# Patient Record
Sex: FEMALE | Race: WHITE | NOT HISPANIC OR LATINO | Employment: UNEMPLOYED | URBAN - METROPOLITAN AREA
[De-identification: names, ages, dates, MRNs, and addresses within clinical notes are randomized per-mention and may not be internally consistent; named-entity substitution may affect disease eponyms.]

---

## 2017-02-07 ENCOUNTER — TELEPHONE (OUTPATIENT)
Dept: OBSTETRICS AND GYNECOLOGY | Facility: CLINIC | Age: 52
End: 2017-02-07

## 2017-02-07 ENCOUNTER — OFFICE VISIT (OUTPATIENT)
Dept: OBSTETRICS AND GYNECOLOGY | Facility: CLINIC | Age: 52
End: 2017-02-07
Payer: COMMERCIAL

## 2017-02-07 VITALS
WEIGHT: 112.31 LBS | HEIGHT: 63 IN | BODY MASS INDEX: 19.9 KG/M2 | SYSTOLIC BLOOD PRESSURE: 112 MMHG | DIASTOLIC BLOOD PRESSURE: 82 MMHG

## 2017-02-07 DIAGNOSIS — N95.2 ATROPHIC VAGINITIS: ICD-10-CM

## 2017-02-07 DIAGNOSIS — N76.0 VAGINITIS AND VULVOVAGINITIS: Primary | ICD-10-CM

## 2017-02-07 DIAGNOSIS — N95.1 VAGINAL DRYNESS, MENOPAUSAL: ICD-10-CM

## 2017-02-07 DIAGNOSIS — N89.8 VAGINA ITCHING: ICD-10-CM

## 2017-02-07 PROCEDURE — 87480 CANDIDA DNA DIR PROBE: CPT

## 2017-02-07 PROCEDURE — 99999 PR PBB SHADOW E&M-EST. PATIENT-LVL III: CPT | Mod: PBBFAC,,, | Performed by: NURSE PRACTITIONER

## 2017-02-07 PROCEDURE — 99214 OFFICE O/P EST MOD 30 MIN: CPT | Mod: S$GLB,,, | Performed by: NURSE PRACTITIONER

## 2017-02-07 RX ORDER — TAMOXIFEN CITRATE 20 MG/1
TABLET ORAL
COMMUNITY
Start: 2015-12-09 | End: 2017-11-15

## 2017-02-07 NOTE — MR AVS SNAPSHOT
"    [facility]  [address]  [place] [address]  Phone: [phone]  Fax: [phone]                  Gwendolyn Abraham   2017 9:40 AM   Office Visit    Description:  Female : 1965   Provider:  Jaycee Yi NP   Department:  [facility]           Reason for Visit     Vaginal Itching                To Do List           Future Appointments        Provider Department Dept Phone    3/21/2017 9:00 AM Enedina Short MD [facility] [phone]      Goals (5 Years of Data)     None      [facility]     [facility] Care [facility] -  Assistance  Registered nurses in the [facility] provide clinical advisement, health education, appointment booking, and other advisory services.  Call for this free service at [phone].             Medications           Message regarding Medications     Verify the changes and/or additions to your medication regime listed below are the same as discussed with your clinician today.  If any of these changes or additions are incorrect, please notify your healthcare provider.             Verify that the below list of medications is an accurate representation of the medications you are currently taking.  If none reported, the list may be blank. If incorrect, please contact your healthcare provider. Carry this list with you in case of emergency.           Current Medications     CITALOPRAM HYDROBROMIDE (CELEXA ORAL)     ESTRADIOL (VAGIFEM VAGL)     tamoxifen (NOLVADEX) 20 MG Tab            Clinical Reference Information           Your Vitals Were     BP Height Weight BMI       112/82 5' 3" (1.6 m) 51 kg (112 lb 5.2 oz) 19.9 kg/m2       Blood Pressure          Most Recent Value    BP  112/82      Allergies as of 2017     Codeine      Immunizations Administered on Date of Encounter - 2017     None      [facility] Sign-Up     Activating your [facility] account is as easy as 1-2-3!     1) Visit " [email or web address], select Sign Up Now, enter this activation code and your date of birth, then select Next.  [email or web address]  Expires: 3/24/2017  9:40 AM      2) Create a username and password to use when you visit [facility] in the future and select a security question in case you lose your password and select Next.    3) Enter your e-mail address and click Sign Up!    Additional Information  If you have questions, please e-mail [email or web address] or call [phone] to talk to our [facility] staff. Remember, [facility] is NOT to be used for urgent needs. For medical emergencies, dial 911.         Language Assistance Services     ATTENTION: Language assistance services are available, free of charge. Please call [phone].      ATENCIÓN: Si habla español, tiene a rubio disposición servicios gratuitos de asistencia lingüística. Llame al [phone].     CHÚ Ý: N?u b?n nói Ti?ng Vi?t, có các d?ch v? h? tr? ngôn ng? mi?n phí dành cho b?n. G?i s? [phone].         [facility] complies with applicable Federal civil rights laws and does not discriminate on the basis of race, color, national origin, age, disability, or sex.

## 2017-02-07 NOTE — PROGRESS NOTES
"Chief complaint: Vaginal Irritation    HPI: 51 y.o. pt with h/o breast CA/mastectomy/reconstruction taking tamoxifen. Vaginal Irritation/itching for the last several days. She reports that about 10 days ago she developed the symptoms. She then took a one day monistat 3 days later that had no symptom resolution. She then took a diflucan on Saturday and one yesterday with possible symptom improvement but not resolution. She does have known vaginal dryness/atrophy and also taking Vagifem and discussed with oncologist as well. She does generally feel dry, no use of moisturizers but does use lubrication with intercourse which can still be painful. She reports no changes in sexual partners, soaps, detergents, douching. No recent use of antibiotics. She denies pelvic pain, fever, chills, n/v/lesions or cuts.      ROS   Systemic: Not feeling tired (fatigue).  No fever chills   Gastrointestinal: No nausea, vomiting, no abdominal pain.  No diarrhea.  Genitourinary: No dysuria. No Pelvic Pain  Skin: No rash.  Visit Vitals    /82    Ht 5' 3" (1.6 m)    Wt 51 kg (112 lb 5.2 oz)    BMI 19.9 kg/m2       Physical Exam   Vital Signs:° Normal.  General Appearance:° Well developed. ° Well nourished.  Lymph Nodes: no Inguinal LAD  Urinary System:° Normal. Urethra: ° Meatus showed no abnormalities. ° No mass on the urethra.  Genitalia: External: ° Vulva was atrophied with labia minora obliterated ° Genitalia exhibited no lesion.                    Vagina: ° Mucosa was atrophied. ° A vaginal discharge was observed scant amt of thin white  Pelvic: Cervix: ° No cervical discharge. ° Showed no lesion. ° Not tender, no CMT         Uterus: ° Position was normal. ° Not tender.  Neurological:° No disorientation was observed.  Psychiatric:Affect: ° Normal.  Skin:° No skin lesions.   Perineum:° Normal. ° Did not have a mass.° No perineal lesions/rashes/erythema     Assessment/Plan:  1. Yeast infection vs atrophic vaginitis--affirm " ordered, possible residual vagifem, will call with results and changes in plan. Take diflucan as directed. Also discussed with pt that this may be both atrophic vaginitis and yeast both making a more severe itching/irritaion in the vagina. Discussed prevention measures including maintainece moisturizers and lubrication with intercourse. Will call with results and plan changes    RTC prn and as scheduled for annual exam.

## 2017-02-07 NOTE — TELEPHONE ENCOUNTER
Han pt, having vaginal burning, itching and swelling. Pt took diflacan and monistat but got not relief.

## 2017-02-08 LAB
CANDIDA RRNA VAG QL PROBE: NEGATIVE
G VAGINALIS RRNA GENITAL QL PROBE: NEGATIVE
T VAGINALIS RRNA GENITAL QL PROBE: NEGATIVE

## 2017-02-09 ENCOUNTER — TELEPHONE (OUTPATIENT)
Dept: OBSTETRICS AND GYNECOLOGY | Facility: CLINIC | Age: 52
End: 2017-02-09

## 2017-02-09 NOTE — TELEPHONE ENCOUNTER
Left message for pt to call back. Affirm (vaginal swab) was neg, wanted to see how pt is feeling and discusses results

## 2017-02-22 ENCOUNTER — TELEPHONE (OUTPATIENT)
Dept: OBSTETRICS AND GYNECOLOGY | Facility: CLINIC | Age: 52
End: 2017-02-22

## 2017-02-23 ENCOUNTER — PATIENT MESSAGE (OUTPATIENT)
Dept: OBSTETRICS AND GYNECOLOGY | Facility: CLINIC | Age: 52
End: 2017-02-23

## 2017-03-21 ENCOUNTER — OFFICE VISIT (OUTPATIENT)
Dept: OBSTETRICS AND GYNECOLOGY | Facility: CLINIC | Age: 52
End: 2017-03-21
Payer: COMMERCIAL

## 2017-03-21 VITALS
HEIGHT: 63 IN | DIASTOLIC BLOOD PRESSURE: 80 MMHG | SYSTOLIC BLOOD PRESSURE: 110 MMHG | WEIGHT: 113.31 LBS | BODY MASS INDEX: 20.08 KG/M2

## 2017-03-21 DIAGNOSIS — Z11.51 SCREENING FOR HUMAN PAPILLOMAVIRUS: ICD-10-CM

## 2017-03-21 DIAGNOSIS — Z01.419 WELL FEMALE EXAM WITH ROUTINE GYNECOLOGICAL EXAM: Primary | ICD-10-CM

## 2017-03-21 PROCEDURE — 88175 CYTOPATH C/V AUTO FLUID REDO: CPT

## 2017-03-21 PROCEDURE — 87624 HPV HI-RISK TYP POOLED RSLT: CPT

## 2017-03-21 PROCEDURE — 99999 PR PBB SHADOW E&M-EST. PATIENT-LVL III: CPT | Mod: PBBFAC,,, | Performed by: OBSTETRICS & GYNECOLOGY

## 2017-03-21 PROCEDURE — 99396 PREV VISIT EST AGE 40-64: CPT | Mod: S$GLB,,, | Performed by: OBSTETRICS & GYNECOLOGY

## 2017-03-21 RX ORDER — TAMOXIFEN CITRATE 10 MG/1
TABLET ORAL
COMMUNITY
Start: 2017-03-18 | End: 2017-03-21

## 2017-03-21 RX ORDER — ESTRADIOL 10 UG/1
INSERT VAGINAL
COMMUNITY
Start: 2017-02-18 | End: 2017-03-21

## 2017-03-21 RX ORDER — CITALOPRAM 40 MG/1
TABLET, FILM COATED ORAL
COMMUNITY
Start: 2017-03-18 | End: 2022-02-14 | Stop reason: SDUPTHER

## 2017-03-21 NOTE — MR AVS SNAPSHOT
"    [facility]  [address]  [place] [address]  Phone: [phone]  Fax: [phone]                  Gwendolyn Abraham   3/21/2017 9:00 AM   Office Visit    Description:  Female : 1965   Provider:  Enedina Short MD   Department:  [facility]           Diagnoses this Visit        Comments    Well female exam with routine gynecological exam    -  Primary     Screening for human papillomavirus                To Do List           Goals (5 Years of Data)     None      [facility]     [facility] Care [facility] -  Assistance  Registered nurses in the [facility] provide clinical advisement, health education, appointment booking, and other advisory services.  Call for this free service at [phone].             Medications           Message regarding Medications     Verify the changes and/or additions to your medication regime listed below are the same as discussed with your clinician today.  If any of these changes or additions are incorrect, please notify your healthcare provider.        STOP taking these medications     VAGIFEM 10 mcg Tab     ESTRADIOL (VAGIFEM VAGL)            Verify that the below list of medications is an accurate representation of the medications you are currently taking.  If none reported, the list may be blank. If incorrect, please contact your healthcare provider. Carry this list with you in case of emergency.           Current Medications     citalopram (CELEXA) 40 MG tablet     FEXOFENADINE/PSEUDOEPHEDRINE (ALLEGRA-D 24 HOUR ORAL)     tamoxifen (NOLVADEX) 20 MG Tab            Clinical Reference Information           Your Vitals Were     BP Height Weight BMI       110/80 5' 3" (1.6 m) 51.4 kg (113 lb 5.1 oz) 20.07 kg/m2       Blood Pressure          Most Recent Value    BP  110/80      Allergies as of 3/21/2017     Codeine      Immunizations Administered on Date of Encounter - 3/21/2017     None      Orders " Placed During Today's Visit      Normal Orders This Visit    HPV DNA probe, amplified     Liquid-based pap smear, screening       Language Assistance Services     ATTENTION: Language assistance services are available, free of charge. Please call [phone].      ATENCIÓN: Si habla justine, tiene a rubio disposición servicios gratuitos de asistencia lingüística. Llame al [phone].     CHÚ Ý: N?u b?n nói Ti?ng Vi?t, có các d?ch v? h? tr? ngôn ng? mi?n phí dành cho b?n. G?i s? [phone].         [facility] complies with applicable Federal civil rights laws and does not discriminate on the basis of race, color, national origin, age, disability, or sex.

## 2017-03-21 NOTE — PROGRESS NOTES
"CC: Well woman exam    Gwendolyn Abraham is a 52 y.o. female  presents for a well woman exam.  She is established.      HPI:  52 year old here for annual exam.  Patient s/p mastectomy with chemotherapy.  Currently on Tamoxifen.  No vaginal bleeding no breast complaints.  Having vaginal dryness and is planning to try the vaginal laser.  No hot flashes but having bone pains.      Past Medical History:   Diagnosis Date    Anemia     Asthma     Breast cancer     Breast neoplasm     right, Stage 2 s/p bilateral mastectomy, chemo with Adriamycin, Taxol, and Cytoxan, and XRT Qd x 6 weeks.  Followed by Dr. Hernandez    Depression     Menopausal vaginal dryness        Past Surgical History:   Procedure Laterality Date    BILATERAL SALPINGOOPHORECTOMY      BREAST RECONSTRUCTION      Breast reconstruction was performed (Stage I - ) (Stage 2 - , Staph infection)     SECTION  1991    HYSTERECTOMY, SUPRACERVICAL  2012    MASTECTOMY Bilateral        OB History    Para Term  AB SAB TAB Ectopic Multiple Living   2 2 2       2      # Outcome Date GA Lbr Devin/2nd Weight Sex Delivery Anes PTL Lv   2 Term     M CS-LTranv   Y   1 Term     F CS-LTranv   Y      Obstetric Comments   Menarche 11       Family History   Problem Relation Age of Onset    Hypertension Mother     Diabetes Father     Hypertension Father     Heart disease Father     Lung disease Father      MAC    Heart defect Father     Deep vein thrombosis Father     Coronary artery disease Father     Breast cancer Maternal Grandmother     Colon cancer Neg Hx     Ovarian cancer Neg Hx        Social History   Substance Use Topics    Smoking status: Never Smoker    Smokeless tobacco: None    Alcohol use Yes      Comment: ocassionally       /80  Ht 5' 3" (1.6 m)  Wt 51.4 kg (113 lb 5.1 oz)  BMI 20.07 kg/m2    ROS:  Review of Systems   Reason unable to perform ROS: no breast complaints. "   Constitutional: Negative for fatigue and unexpected weight change.   Respiratory: Negative for shortness of breath.    Cardiovascular: Negative for chest pain.   Gastrointestinal: Negative for abdominal pain, constipation, diarrhea and nausea.   Genitourinary: Positive for vaginal pain (with intercourse ). Negative for dyspareunia, dysuria, pelvic pain and vaginal bleeding.   Musculoskeletal: Negative for back pain.   Skin: Negative for rash.   Neurological: Negative for headaches.   Hematological: Negative for adenopathy.   Psychiatric/Behavioral: Negative for dysphoric mood. The patient is not nervous/anxious.        Physical Exam:  Physical Exam:   Constitutional: She is oriented to person, place, and time. She appears well-developed and well-nourished.      Neck: No thyromegaly present.    Cardiovascular: Normal rate.     Pulmonary/Chest: Effort normal and breath sounds normal. Right breast exhibits no mass, no nipple discharge, no skin change, no tenderness and no bleeding. Left breast exhibits no mass, no nipple discharge, no skin change, no tenderness and no bleeding.        Abdominal: Soft. Normal appearance and bowel sounds are normal. She exhibits no distension and no mass. There is no tenderness. There is no rebound and no guarding.     Genitourinary: Vagina normal. Pelvic exam was performed with patient supine. There is no rash, tenderness, lesion or injury on the right labia. There is no rash, tenderness, lesion or injury on the left labia. Uterus is absent. Cervix is normal. Right adnexum displays no mass, no tenderness and no fullness. Left adnexum displays no mass, no tenderness and no fullness. No erythema, tenderness, rectocele, cystocele or unspecified prolapse of vaginal walls in the vagina. No signs of injury around the vagina. No vaginal discharge found.           Musculoskeletal: Normal range of motion and moves all extremeties.      Lymphadenopathy:     She has no axillary adenopathy.         Right: No supraclavicular adenopathy present.        Left: No supraclavicular adenopathy present.    Neurological: She is alert and oriented to person, place, and time.    Skin: Skin is warm and dry.    Psychiatric: She has a normal mood and affect. Her behavior is normal. Judgment normal.       ASSESSMENT AND PLAN  Well female exam with routine gynecological exam  -     HPV DNA probe, amplified  -     Liquid-based pap smear, screening    Screening for human papillomavirus  -     HPV DNA probe, amplified      Discussed screening guidelines   Patient was counseled today on A.C.S. Pap guidelines and recommendations for yearly pelvic exams, mammograms and monthly self breast exams; to see her PCP for other health maintenance.     Return in about 1 year (around 3/21/2018).

## 2017-03-27 LAB
HPV16 DNA SPEC QL NAA+PROBE: NEGATIVE
HPV16+18+H RISK 12 DNA CVX-IMP: NEGATIVE
HPV18 DNA SPEC QL NAA+PROBE: NEGATIVE

## 2017-11-15 ENCOUNTER — OFFICE VISIT (OUTPATIENT)
Dept: OBSTETRICS AND GYNECOLOGY | Facility: CLINIC | Age: 52
End: 2017-11-15
Payer: COMMERCIAL

## 2017-11-15 VITALS
BODY MASS INDEX: 20.28 KG/M2 | HEIGHT: 63 IN | DIASTOLIC BLOOD PRESSURE: 68 MMHG | WEIGHT: 114.44 LBS | SYSTOLIC BLOOD PRESSURE: 108 MMHG

## 2017-11-15 DIAGNOSIS — N95.2 POSTMENOPAUSAL ATROPHIC VAGINITIS: Primary | ICD-10-CM

## 2017-11-15 DIAGNOSIS — N76.0 VAGINITIS AND VULVOVAGINITIS: ICD-10-CM

## 2017-11-15 PROCEDURE — 87480 CANDIDA DNA DIR PROBE: CPT

## 2017-11-15 PROCEDURE — 99999 PR PBB SHADOW E&M-EST. PATIENT-LVL III: CPT | Mod: PBBFAC,,, | Performed by: NURSE PRACTITIONER

## 2017-11-15 PROCEDURE — 99213 OFFICE O/P EST LOW 20 MIN: CPT | Mod: S$GLB,,, | Performed by: NURSE PRACTITIONER

## 2017-11-15 PROCEDURE — 87660 TRICHOMONAS VAGIN DIR PROBE: CPT

## 2017-11-15 RX ORDER — NYSTATIN AND TRIAMCINOLONE ACETONIDE 100000; 1 [USP'U]/G; MG/G
OINTMENT TOPICAL 3 TIMES DAILY
Qty: 15 G | Refills: 1 | Status: SHIPPED | OUTPATIENT
Start: 2017-11-15 | End: 2022-08-26

## 2017-11-15 RX ORDER — ESTRADIOL 10 UG/1
TABLET VAGINAL
COMMUNITY
Start: 2017-09-24 | End: 2022-08-26

## 2017-11-15 RX ORDER — FLUCONAZOLE 200 MG/1
TABLET ORAL
COMMUNITY
Start: 2017-11-09 | End: 2022-02-14

## 2017-11-15 RX ORDER — LETROZOLE 2.5 MG/1
TABLET, FILM COATED ORAL
COMMUNITY
Start: 2017-11-05 | End: 2022-05-13 | Stop reason: SDUPTHER

## 2017-11-16 ENCOUNTER — TELEPHONE (OUTPATIENT)
Dept: OBSTETRICS AND GYNECOLOGY | Facility: CLINIC | Age: 52
End: 2017-11-16

## 2017-11-16 NOTE — TELEPHONE ENCOUNTER
"----- Message from Sarah Guevara NP sent at 11/16/2017  8:20 AM CST -----  Call patient and tell her.....    "Your vaginal swab from the office came back negative.  This was a test for a yeast infection or a bacterial infection.  Your swab was normal.  Please call the office if you have any other questions.    (If needed for vaginal discharge and/or feminine odor, she may use OTC Rephresh gel.  It is a vaginal gel used to neutralize and maintain a healthy vaginal pH.  Patients who get Yeast or BV often use this to help reduce risk of vaginal issues.  It can be found at any drugstore on the feminine product aisle, and can be used as frequently as every 3 days.)  "

## 2017-11-16 NOTE — PROGRESS NOTES
Chief Complaint: Problem:     Chief Complaint   Patient presents with    Vulvar burning     since using KY on vacation x1wk ago        (Dr. Short patient)  Last Pap:  Supracervical HYST in       HPI:      Gwendolyn Abraham is a 52 y.o.  who presents today for complaints of vulvar burning, irritation, and redness after using KY jelly with her  while on vacation prior to intercourse.  She had a d=Diflucan at home and took it, but had no relief in symptoms.   Denies vaginal d/c.  States she has used KY many times in the past and they have not had a problem with it, nor has she had any adverse reaction to it before.   States she completed 3 sessions of vaginal rejuvenation with the Aileen Renetta laser device; last treatment done in 2017.  She feels like the day-to-day vaginal burning has improved significantly since the laser treatments, but intercourse remains painful.    LMP: No LMP recorded. Patient has had a hysterectomy.    Ms. Abraham is currently sexually active with a single male partner.   She declines STD screening today with her examination.      Past Medical History:   Diagnosis Date    Anemia     Asthma     Breast cancer     Breast neoplasm     right, Stage 2 s/p bilateral mastectomy, chemo with Adriamycin, Taxol, and Cytoxan, and XRT Qd x 6 weeks.  Followed by Dr. Hernandez    Depression     Menopausal vaginal dryness      Past Surgical History:   Procedure Laterality Date    BILATERAL SALPINGOOPHORECTOMY      BREAST RECONSTRUCTION      Breast reconstruction was performed (Stage I - 2012) (Stage 2 - 2013, Staph infection)     SECTION  1991    HYSTERECTOMY, SUPRACERVICAL  2012    MASTECTOMY Bilateral      Social History     Social History    Marital status:      Spouse name: N/A    Number of children: N/A    Years of education: N/A     Occupational History    Not on file.     Social History Main Topics    Smoking status: Never Smoker    Smokeless  "tobacco: Never Used    Alcohol use Yes      Comment: ocassionally    Drug use: No    Sexual activity: Yes     Partners: Male     Birth control/ protection: None      Comment:      Other Topics Concern    Not on file     Social History Narrative    No narrative on file     Family History   Problem Relation Age of Onset    Hypertension Mother     Diabetes Father     Hypertension Father     Heart disease Father     Lung disease Father      MAC    Heart defect Father     Deep vein thrombosis Father     Coronary artery disease Father     Breast cancer Maternal Grandmother     Colon cancer Neg Hx     Ovarian cancer Neg Hx      OB History      Para Term  AB Living    2 2 2     2    SAB TAB Ectopic Multiple Live Births            2        Obstetric Comments    Menarche 11          ROS:     GENERAL: Denies unintentional weight gain or weight loss. Feeling well overall.   SKIN: Denies rash or lesions.   HEENT: Denies headaches, or vision changes.   CARDIOVASCULAR: Denies palpitations or chest pain.   RESPIRATORY: Denies shortness of breath or dyspnea on exertion.  BREASTS: Denies pain, lumps, or nipple discharge.   ABDOMEN: Denies abdominal pain, constipation, diarrhea, nausea, vomiting, change in appetite.  URINARY: Denies frequency, dysuria, hematuria.  NEUROLOGIC: Denies syncope or weakness.   PSYCHIATRIC: Denies depression, anxiety or mood swings.  VULVAR: Reports pain, erythema, and irritation; no lesions and no itching.  VAGINAL: No relaxation, no itching, no abnormal bleeding and no lesions.    Physical Exam:      PHYSICAL EXAM:  /68   Ht 5' 3" (1.6 m)   Wt 51.9 kg (114 lb 6.7 oz)   BMI 20.27 kg/m²   Body mass index is 20.27 kg/m².     APPEARANCE: Well nourished, well developed, in no acute distress.  PSYCH: Appropriate mood and affect.  SKIN: No acne or hirsutism.  NECK: Neck symmetric without masses or thyromegaly  NODES: No inguinal, axillary, or supraclavicular lymph " node enlargement  CHEST: Normal respiratory effort.  ABDOMEN: Soft.  No tenderness or masses.    BREASTS: Symmetrical, no skin changes or visible lesions.  No palpable masses or nipple discharge bilaterally.  PELVIC: Normal external genitalia without lesions; moderate erythema and mild swelling to inner labia minora.  Normal hair distribution.  Adequate perineal body, normal urethral meatus.  Vagina moist and well rugated without lesions or discharge.  Cervix pink, without lesions, discharge or tenderness.  No significant cystocele or rectocele.  Bimanual exam shows uterus to be absent.  Adnexa without masses or tenderness.    EXTREMITIES: No edema.    Assessment/Plan:     Postmenopausal atrophic vaginitis    Vaginitis and vulvovaginitis  -     nystatin-triamcinolone (MYCOLOG) ointment; Apply topically 3 (three) times daily. X 7-10 days  Dispense: 15 g; Refill: 1  -     Vaginosis Screen by DNA Probe    *If symptoms persist/worsen after using Mycolog for 7-10 days, advised pt to RTC to see me or Dr. Short.      Counseling:     Patient was counseled today on current ASCCP pap guidelines, the recommendation for yearly pelvic exams, healthy diet and exercise routines, annual mammograms and breast self awareness. She is to see her PCP for other health maintenance.     Return if symptoms worsen or fail to improve.

## 2021-07-14 ENCOUNTER — TELEPHONE (OUTPATIENT)
Dept: SURGERY | Facility: CLINIC | Age: 56
End: 2021-07-14

## 2021-07-23 ENCOUNTER — TELEPHONE (OUTPATIENT)
Dept: HEMATOLOGY/ONCOLOGY | Facility: CLINIC | Age: 56
End: 2021-07-23

## 2021-07-26 ENCOUNTER — OFFICE VISIT (OUTPATIENT)
Dept: HEMATOLOGY/ONCOLOGY | Facility: CLINIC | Age: 56
End: 2021-07-26
Payer: COMMERCIAL

## 2021-07-26 VITALS
RESPIRATION RATE: 16 BRPM | HEIGHT: 63 IN | WEIGHT: 111.44 LBS | DIASTOLIC BLOOD PRESSURE: 61 MMHG | OXYGEN SATURATION: 100 % | HEART RATE: 78 BPM | SYSTOLIC BLOOD PRESSURE: 117 MMHG | BODY MASS INDEX: 19.75 KG/M2 | TEMPERATURE: 97 F

## 2021-07-26 DIAGNOSIS — Z17.0 MALIGNANT NEOPLASM OF UPPER-OUTER QUADRANT OF RIGHT BREAST IN FEMALE, ESTROGEN RECEPTOR POSITIVE: Primary | ICD-10-CM

## 2021-07-26 DIAGNOSIS — C50.411 MALIGNANT NEOPLASM OF UPPER-OUTER QUADRANT OF RIGHT BREAST IN FEMALE, ESTROGEN RECEPTOR POSITIVE: Primary | ICD-10-CM

## 2021-07-26 PROCEDURE — 99204 OFFICE O/P NEW MOD 45 MIN: CPT | Mod: S$GLB,,, | Performed by: INTERNAL MEDICINE

## 2021-07-26 PROCEDURE — 1159F PR MEDICATION LIST DOCUMENTED IN MEDICAL RECORD: ICD-10-PCS | Mod: CPTII,S$GLB,, | Performed by: INTERNAL MEDICINE

## 2021-07-26 PROCEDURE — 3008F PR BODY MASS INDEX (BMI) DOCUMENTED: ICD-10-PCS | Mod: CPTII,S$GLB,, | Performed by: INTERNAL MEDICINE

## 2021-07-26 PROCEDURE — 1126F PR PAIN SEVERITY QUANTIFIED, NO PAIN PRESENT: ICD-10-PCS | Mod: CPTII,S$GLB,, | Performed by: INTERNAL MEDICINE

## 2021-07-26 PROCEDURE — 99999 PR PBB SHADOW E&M-EST. PATIENT-LVL III: CPT | Mod: PBBFAC,,, | Performed by: INTERNAL MEDICINE

## 2021-07-26 PROCEDURE — 99999 PR PBB SHADOW E&M-EST. PATIENT-LVL III: ICD-10-PCS | Mod: PBBFAC,,, | Performed by: INTERNAL MEDICINE

## 2021-07-26 PROCEDURE — 1126F AMNT PAIN NOTED NONE PRSNT: CPT | Mod: CPTII,S$GLB,, | Performed by: INTERNAL MEDICINE

## 2021-07-26 PROCEDURE — 99204 PR OFFICE/OUTPT VISIT, NEW, LEVL IV, 45-59 MIN: ICD-10-PCS | Mod: S$GLB,,, | Performed by: INTERNAL MEDICINE

## 2021-07-26 PROCEDURE — 1159F MED LIST DOCD IN RCRD: CPT | Mod: CPTII,S$GLB,, | Performed by: INTERNAL MEDICINE

## 2021-07-26 PROCEDURE — 3008F BODY MASS INDEX DOCD: CPT | Mod: CPTII,S$GLB,, | Performed by: INTERNAL MEDICINE

## 2021-07-26 RX ORDER — FERROUS SULFATE, DRIED 160(50) MG
1 TABLET, EXTENDED RELEASE ORAL 2 TIMES DAILY WITH MEALS
COMMUNITY

## 2021-07-26 RX ORDER — CHOLECALCIFEROL (VITAMIN D3) 25 MCG
1000 TABLET ORAL DAILY
COMMUNITY

## 2022-01-06 ENCOUNTER — OFFICE VISIT (OUTPATIENT)
Dept: URGENT CARE | Facility: CLINIC | Age: 57
End: 2022-01-06
Payer: COMMERCIAL

## 2022-01-06 VITALS
HEIGHT: 63 IN | BODY MASS INDEX: 19.67 KG/M2 | SYSTOLIC BLOOD PRESSURE: 138 MMHG | TEMPERATURE: 99 F | OXYGEN SATURATION: 96 % | RESPIRATION RATE: 16 BRPM | WEIGHT: 111 LBS | DIASTOLIC BLOOD PRESSURE: 83 MMHG | HEART RATE: 108 BPM

## 2022-01-06 DIAGNOSIS — R05.9 COUGH: ICD-10-CM

## 2022-01-06 DIAGNOSIS — R09.89 RUNNY NOSE: ICD-10-CM

## 2022-01-06 DIAGNOSIS — U07.1 COVID-19: Primary | ICD-10-CM

## 2022-01-06 LAB
CTP QC/QA: YES
SARS-COV-2 RDRP RESP QL NAA+PROBE: POSITIVE

## 2022-01-06 PROCEDURE — 1159F MED LIST DOCD IN RCRD: CPT | Mod: CPTII,S$GLB,, | Performed by: PHYSICIAN ASSISTANT

## 2022-01-06 PROCEDURE — 1160F RVW MEDS BY RX/DR IN RCRD: CPT | Mod: CPTII,S$GLB,, | Performed by: PHYSICIAN ASSISTANT

## 2022-01-06 PROCEDURE — 3075F SYST BP GE 130 - 139MM HG: CPT | Mod: CPTII,S$GLB,, | Performed by: PHYSICIAN ASSISTANT

## 2022-01-06 PROCEDURE — 1159F PR MEDICATION LIST DOCUMENTED IN MEDICAL RECORD: ICD-10-PCS | Mod: CPTII,S$GLB,, | Performed by: PHYSICIAN ASSISTANT

## 2022-01-06 PROCEDURE — 3079F PR MOST RECENT DIASTOLIC BLOOD PRESSURE 80-89 MM HG: ICD-10-PCS | Mod: CPTII,S$GLB,, | Performed by: PHYSICIAN ASSISTANT

## 2022-01-06 PROCEDURE — 3008F BODY MASS INDEX DOCD: CPT | Mod: CPTII,S$GLB,, | Performed by: PHYSICIAN ASSISTANT

## 2022-01-06 PROCEDURE — U0002 COVID-19 LAB TEST NON-CDC: HCPCS | Mod: QW,S$GLB,, | Performed by: PHYSICIAN ASSISTANT

## 2022-01-06 PROCEDURE — 99203 OFFICE O/P NEW LOW 30 MIN: CPT | Mod: S$GLB,,, | Performed by: PHYSICIAN ASSISTANT

## 2022-01-06 PROCEDURE — 1160F PR REVIEW ALL MEDS BY PRESCRIBER/CLIN PHARMACIST DOCUMENTED: ICD-10-PCS | Mod: CPTII,S$GLB,, | Performed by: PHYSICIAN ASSISTANT

## 2022-01-06 PROCEDURE — 3075F PR MOST RECENT SYSTOLIC BLOOD PRESS GE 130-139MM HG: ICD-10-PCS | Mod: CPTII,S$GLB,, | Performed by: PHYSICIAN ASSISTANT

## 2022-01-06 PROCEDURE — 3008F PR BODY MASS INDEX (BMI) DOCUMENTED: ICD-10-PCS | Mod: CPTII,S$GLB,, | Performed by: PHYSICIAN ASSISTANT

## 2022-01-06 PROCEDURE — 3079F DIAST BP 80-89 MM HG: CPT | Mod: CPTII,S$GLB,, | Performed by: PHYSICIAN ASSISTANT

## 2022-01-06 PROCEDURE — 99203 PR OFFICE/OUTPT VISIT, NEW, LEVL III, 30-44 MIN: ICD-10-PCS | Mod: S$GLB,,, | Performed by: PHYSICIAN ASSISTANT

## 2022-01-06 PROCEDURE — U0002: ICD-10-PCS | Mod: QW,S$GLB,, | Performed by: PHYSICIAN ASSISTANT

## 2022-01-06 RX ORDER — BENZONATATE 100 MG/1
100 CAPSULE ORAL 3 TIMES DAILY PRN
Qty: 15 CAPSULE | Refills: 0 | Status: SHIPPED | OUTPATIENT
Start: 2022-01-06 | End: 2022-08-26

## 2022-01-06 RX ORDER — BENZONATATE 100 MG/1
100 CAPSULE ORAL 3 TIMES DAILY PRN
Qty: 15 CAPSULE | Refills: 0 | Status: SHIPPED | OUTPATIENT
Start: 2022-01-06 | End: 2022-01-06

## 2022-01-06 NOTE — PROGRESS NOTES
"Subjective:       Patient ID: Gwendolyn Abraham is a 56 y.o. female.    Vitals:  height is 5' 3" (1.6 m) and weight is 50.3 kg (111 lb). Her temperature is 98.5 °F (36.9 °C). Her blood pressure is 138/83 and her pulse is 108. Her respiration is 16 and oxygen saturation is 96%.     Chief Complaint: URI    Pt presents body aches, sore throat, fever, headache and cough since Tuesday    URI   This is a new problem. The current episode started in the past 7 days. The problem has been unchanged. There has been no fever. Associated symptoms include congestion, coughing and a sore throat. Treatments tried: Delsium. The treatment provided no relief.       HENT: Positive for congestion and sore throat.    Respiratory: Positive for cough.        Objective:      Physical Exam      Assessment:       1. COVID-19    2. Runny nose    3. Cough        Results for orders placed or performed in visit on 01/06/22   POCT COVID-19 Rapid Screening   Result Value Ref Range    POC Rapid COVID Positive (A) Negative     Acceptable Yes        Plan:         COVID-19    Runny nose  -     POCT COVID-19 Rapid Screening    Cough  -     Discontinue: benzonatate (TESSALON) 100 MG capsule; Take 1 capsule (100 mg total) by mouth 3 (three) times daily as needed for Cough.  Dispense: 15 capsule; Refill: 0  -     benzonatate (TESSALON) 100 MG capsule; Take 1 capsule (100 mg total) by mouth 3 (three) times daily as needed for Cough.  Dispense: 15 capsule; Refill: 0           Medical Decision Making:   Clinical Tests:   Lab Tests: Ordered and Reviewed       <> Summary of Lab: COVID+  Urgent Care Management:  Patient's COVID risk score: 1  Patient presents with complaints of URI like symptoms for the past 2 days.  On exam, she is afebrile and nontoxic appearing.  Lungs clear to auscultation bilaterally.  Vital signs reassuring.  She has tested positive for COVID-19.  Isolation requirements as well as ED precautions were discussed.  She was " advised symptomatic treatment as needed for symptoms.  She verbalized understanding and agreed with plan.       Patient Instructions   You have tested positive for COVID-19 today.  Per the CDC, you are now in a 5 day isolation.    This isolation starts from the day you first developed symptoms, not the day of your positive test. For example, if your symptoms began on a Monday, and you waited until Friday of the same week to get tested, and it was positive, your 5 day isolation begins from that Monday, not the Friday you tested positive.    However, if you are asymptomatic (a person who does not have any symptoms), and received a COVID-19 test that was positive, your 5 day isolation begins on the day you tested positive.  This is regardless if you were exposed to a known positive days earlier.  So for example, if you test positive as an asymptomatic on day 7 from exposure, you have now extended your 5 day quarantine.    In order to return to activities of daily living per the CDC guidelines, you can be around other after: 5 days since symptoms first appeared, 24 hours with no fever without the use of fever-reducing medications, and other symptoms (besides loss of taste or smell) must be improving.  Once you meet all these requirements you may return to your normal activities including social distancing, wearing masks, and frequent handwashing - YOU DO NOT NEED ANOTHER TEST, OR TO TEST NEGATIVE, IN ORDER TO END YOUR QUARANTINE!    Anyone who has been exposed to you since 2 days before your symptoms started should follow CDC guidelines for quarantine.  The CDC recommends quarantine if you have been in close contact (within 6 feet of someone for a cumulative total of 15 minutes or more over a 24-hour period) with someone who has COVID-19, unless you have been fully vaccinated. People who are fully vaccinated do NOT need to quarantine after contact with someone who had COVID-19 unless they have symptoms. However, fully  vaccinated people should get tested 3-5 days after their exposure, even they dont have symptoms and wear a mask indoors in public for 10 days following exposure or until their test result is negative.  Individuals are recommended to stay home for 5 days after your last contact with a person who has COVID-19.  Watch for symptoms of COVID-19.  If possible, stay away from people you live with, especially people who are at higher risk for getting very sick from COVID-19.  If you'd like to end your quarantine early, your options are as follows.  You may end your quarantine on day 7 IF you have a negative covid test at least 5 days from last known exposure with no covid symptoms by day 7.  Or you may end quarantine on day with no test and no covid symptoms.    Important home care recommendations include: monitor your symptoms, if you have trouble breathing please go to the ER. Stay in a separate room from other household members, if possible.  Use a separate bathroom, if possible.  Avoid contact with other members of the household and pets.  Don't share personal household items, like cups, towels, and utensils.  Wear a mask when around other people if able.  Please refer to CDC's websit for more information about what to do if you you're sick and how to notify your contacts.    Stay home except to get medical care. Most people with COVID-19 have mild illness and can recover at home without medical care. Do not leave your home, except to get medical care. Do not visit public areas. Get rest and stay hydrated. Call before you get medical care. Be sure to get care if you have trouble breathing, or have any other emergency warning signs, or if you think it is an emergency.  Avoid public transportation, ride-sharing, or taxis.  Separate yourself from other people.  As much as possible, stay in a specific room and away from other people and pets in your home. If possible, you should use a separate bathroom. If you need to be  around other people or animals in or outside of the home, wear a mask.    Tell your close contacts that they may have been exposed to COVID-19. An infected person can spread COVID-19 starting 48 hours (or 2 days) before the person has any symptoms or tests positive. By letting your close contacts know they may have been exposed to COVID-19, you are helping to protect everyone.    Additional guidance is available for those living in close quarters and shared housing on [facility]'s web site.  Please see COVID-19 and Animals if you have questions about pets.  If you are diagnosed with COVID-19, someone from the health department may call you. Answer the call to slow the spread.  Look for emergency warning signs* for COVID-19. If someone is showing any of these signs, seek emergency medical care immediately:  Trouble breathing  Persistent pain or pressure in the chest  New confusion  Inability to wake or stay awake  Pale, gray, or blue-colored skin, lips, or nail beds, depending on skin tone  *This list is not all possible symptoms. Please call your medical provider for any other symptoms that are severe or concerning to you.    Call ahead before visiting your doctor  Call ahead. Many medical visits for routine care are being postponed or done by phone or telemedicine.  If you have a medical appointment that cannot be postponed, call your doctors office, and tell them you have or may have COVID-19. This will help the office protect themselves and other patients.    You dont need to wear the mask if you are alone. If you cant put on a mask (because of trouble breathing, for example), cover your coughs and sneezes in some other way. Try to stay at least 6 feet away from other people. This will help protect the people around you.  Masks should not be placed on young children under age 2 years, anyone who has trouble breathing, or anyone who is not able to remove the mask without help.    Cover your coughs and sneezes  Cover  your mouth and nose with a tissue when you cough or sneeze.  Throw away used tissues in a lined trash can.  Immediately wash your hands with soap and water for at least 20 seconds. If soap and water are not available, clean your hands with an alcohol-based hand  that contains at least 60% alcohol.  hands wash light icon  Clean your hands often  Wash your hands often with soap and water for at least 20 seconds. This is especially important after blowing your nose, coughing, or sneezing; going to the bathroom; and before eating or preparing food.  Use hand  if soap and water are not available. Use an alcohol-based hand  with at least 60% alcohol, covering all surfaces of your hands and rubbing them together until they feel dry.  Soap and water are the best option, especially if hands are visibly dirty.  Avoid touching your eyes, nose, and mouth with unwashed hands.  Handwashing Tips  Avoid sharing personal household items  Do not share dishes, drinking glasses, cups, eating utensils, towels, or bedding with other people in your home.  Wash these items thoroughly after using them with soap and water or put in the .  spraybottle icon  Clean all high-touch surfaces everyday  Clean and disinfect high-touch surfaces in your sick room and bathroom; wear disposable gloves. Let someone else clean and disinfect surfaces in common areas, but you should clean your bedroom and bathroom, if possible.  If a caregiver or other person needs to clean and disinfect a sick persons bedroom or bathroom, they should do so on an as-needed basis. The caregiver/other person should wear a mask and disposable gloves prior to cleaning. They should wait as long as possible after the person who is sick has used the bathroom before coming in to clean and use the bathroom.  High-touch surfaces include phones, remote controls, counters, tabletops, doorknobs, bathroom fixtures, toilets, keyboards, tablets, and  bedside tables.    Clean and disinfect areas that may have blood, stool, or body fluids on them.  Use household  and disinfectants. Clean the area or item with soap and water or another detergent if it is dirty. Then, use a household disinfectant.  Be sure to follow the instructions on the label to ensure safe and effective use of the product. Many products recommend keeping the surface wet for several minutes to ensure germs are killed. Many also recommend precautions such as wearing gloves and making sure you have good ventilation during use of the product.  Use a product from EPAs List N: Disinfectants for Coronavirus (COVID-19)external icon.  Complete Disinfection Guidance      PLEASE READ YOUR DISCHARGE INSTRUCTIONS ENTIRELY AS IT CONTAINS IMPORTANT INFORMATION.    Please drink plenty of fluids.    Please get plenty of rest.    If not allergic, please take over the counter Tylenol (Acetaminophen) and/or Motrin (Ibuprofen) as directed for control of muscle aches, pain, and/or fever.    Please go to the Emergency Department for any shortness of breath or difficulty breathing.    For congestion, runny nose, or post nasal drip please take an over the counter antihistamine medication (Claritin/Zyrtec/Allegra) of your choice as directed or try an over the counter decongestant like Sudafed. You buy this behind the pharmacy counter.  You can also buy combination OTC antihistamine plus decongestant medications such at Zyrtec-D or Claritin-D.  Do not take decongestant if you suffer from high blood pressure.    For cough, you may be prescribed medication.  Take as prescribed.  If you were not prescribed any medication, you can use over the counter cough medications such as Robitussin.  If you have chest congestion you can consider using over the counter Mucinex but make sure you drink plenty of water to encourage mobilization of the secretions.    If you do have high blood pressure or palpitations, it is safe to  take Coricidin HBP for relief of sinus symptoms.    Sore throat recommendations: Warm fluids (tea with honey, soup, broth), warm salt water gargles, throat lozenges, rest, hydration.    If you  smoke, please stop smoking.    You must understand that you've received an Urgent Care treatment only and that you may be released before all of your medical problems are known or treated. You, the patient, will arrange for follow up as instructed. If your symptoms worsen or fail to improve you should go to the Emergency Room.      You have tested positive for COVID-19 today.      ISOLATION  If you tested positive and do not have symptoms, you must isolate for 5 days starting on the day of the positive test. I    If you tested positive and have symptoms, you must isolate for 5 days starting on the day of the first symptoms,  not the day of the positive test.     This is the most important part, both the CDC and the [facility] emphasize that you do not test out of isolation.     After 5 days, if your symptoms have improved and you have not had fever on day 5, you can return to the community on day 6- NO TESTING REQUIRED!      In fact, we do not retest if you were positive in the last 90 days.    After your 5 days of isolation are completed, the CDC recommends strict mask use for the first 5 days that you come out of isolation.  

## 2022-01-06 NOTE — PATIENT INSTRUCTIONS
You have tested positive for COVID-19 today.  Per the CDC, you are now in a 5 day isolation.    This isolation starts from the day you first developed symptoms, not the day of your positive test. For example, if your symptoms began on a Monday, and you waited until Friday of the same week to get tested, and it was positive, your 5 day isolation begins from that Monday, not the Friday you tested positive.    However, if you are asymptomatic (a person who does not have any symptoms), and received a COVID-19 test that was positive, your 5 day isolation begins on the day you tested positive.  This is regardless if you were exposed to a known positive days earlier.  So for example, if you test positive as an asymptomatic on day 7 from exposure, you have now extended your 5 day quarantine.    In order to return to activities of daily living per the CDC guidelines, you can be around other after: 5 days since symptoms first appeared, 24 hours with no fever without the use of fever-reducing medications, and other symptoms (besides loss of taste or smell) must be improving.  Once you meet all these requirements you may return to your normal activities including social distancing, wearing masks, and frequent handwashing - YOU DO NOT NEED ANOTHER TEST, OR TO TEST NEGATIVE, IN ORDER TO END YOUR QUARANTINE!    Anyone who has been exposed to you since 2 days before your symptoms started should follow CDC guidelines for quarantine.  The CDC recommends quarantine if you have been in close contact (within 6 feet of someone for a cumulative total of 15 minutes or more over a 24-hour period) with someone who has COVID-19, unless you have been fully vaccinated. People who are fully vaccinated do NOT need to quarantine after contact with someone who had COVID-19 unless they have symptoms. However, fully vaccinated people should get tested 3-5 days after their exposure, even they dont have symptoms and wear a mask indoors in public for  10 days following exposure or until their test result is negative.  Individuals are recommended to stay home for 5 days after your last contact with a person who has COVID-19.  Watch for symptoms of COVID-19.  If possible, stay away from people you live with, especially people who are at higher risk for getting very sick from COVID-19.  If you'd like to end your quarantine early, your options are as follows.  You may end your quarantine on day 7 IF you have a negative covid test at least 5 days from last known exposure with no covid symptoms by day 7.  Or you may end quarantine on day with no test and no covid symptoms.    Important home care recommendations include: monitor your symptoms, if you have trouble breathing please go to the ER. Stay in a separate room from other household members, if possible.  Use a separate bathroom, if possible.  Avoid contact with other members of the household and pets.  Don't share personal household items, like cups, towels, and utensils.  Wear a mask when around other people if able.  Please refer to CDC's websit for more information about what to do if you you're sick and how to notify your contacts.    Stay home except to get medical care. Most people with COVID-19 have mild illness and can recover at home without medical care. Do not leave your home, except to get medical care. Do not visit public areas. Get rest and stay hydrated. Call before you get medical care. Be sure to get care if you have trouble breathing, or have any other emergency warning signs, or if you think it is an emergency.  Avoid public transportation, ride-sharing, or taxis.  Separate yourself from other people.  As much as possible, stay in a specific room and away from other people and pets in your home. If possible, you should use a separate bathroom. If you need to be around other people or animals in or outside of the home, wear a mask.    Tell your close contacts that they may have been exposed to  COVID-19. An infected person can spread COVID-19 starting 48 hours (or 2 days) before the person has any symptoms or tests positive. By letting your close contacts know they may have been exposed to COVID-19, you are helping to protect everyone.    Additional guidance is available for those living in close quarters and shared housing on CDC's web site.  Please see COVID-19 and Animals if you have questions about pets.  If you are diagnosed with COVID-19, someone from the health department may call you. Answer the call to slow the spread.  Look for emergency warning signs* for COVID-19. If someone is showing any of these signs, seek emergency medical care immediately:  Trouble breathing  Persistent pain or pressure in the chest  New confusion  Inability to wake or stay awake  Pale, gray, or blue-colored skin, lips, or nail beds, depending on skin tone  *This list is not all possible symptoms. Please call your medical provider for any other symptoms that are severe or concerning to you.    Call ahead before visiting your doctor  Call ahead. Many medical visits for routine care are being postponed or done by phone or telemedicine.  If you have a medical appointment that cannot be postponed, call your doctors office, and tell them you have or may have COVID-19. This will help the office protect themselves and other patients.    You dont need to wear the mask if you are alone. If you cant put on a mask (because of trouble breathing, for example), cover your coughs and sneezes in some other way. Try to stay at least 6 feet away from other people. This will help protect the people around you.  Masks should not be placed on young children under age 2 years, anyone who has trouble breathing, or anyone who is not able to remove the mask without help.    Cover your coughs and sneezes  Cover your mouth and nose with a tissue when you cough or sneeze.  Throw away used tissues in a lined trash can.  Immediately wash your hands  with soap and water for at least 20 seconds. If soap and water are not available, clean your hands with an alcohol-based hand  that contains at least 60% alcohol.  hands wash light icon  Clean your hands often  Wash your hands often with soap and water for at least 20 seconds. This is especially important after blowing your nose, coughing, or sneezing; going to the bathroom; and before eating or preparing food.  Use hand  if soap and water are not available. Use an alcohol-based hand  with at least 60% alcohol, covering all surfaces of your hands and rubbing them together until they feel dry.  Soap and water are the best option, especially if hands are visibly dirty.  Avoid touching your eyes, nose, and mouth with unwashed hands.  Handwashing Tips  Avoid sharing personal household items  Do not share dishes, drinking glasses, cups, eating utensils, towels, or bedding with other people in your home.  Wash these items thoroughly after using them with soap and water or put in the .  spraybottle icon  Clean all high-touch surfaces everyday  Clean and disinfect high-touch surfaces in your sick room and bathroom; wear disposable gloves. Let someone else clean and disinfect surfaces in common areas, but you should clean your bedroom and bathroom, if possible.  If a caregiver or other person needs to clean and disinfect a sick persons bedroom or bathroom, they should do so on an as-needed basis. The caregiver/other person should wear a mask and disposable gloves prior to cleaning. They should wait as long as possible after the person who is sick has used the bathroom before coming in to clean and use the bathroom.  High-touch surfaces include phones, remote controls, counters, tabletops, doorknobs, bathroom fixtures, toilets, keyboards, tablets, and bedside tables.    Clean and disinfect areas that may have blood, stool, or body fluids on them.  Use household  and  disinfectants. Clean the area or item with soap and water or another detergent if it is dirty. Then, use a household disinfectant.  Be sure to follow the instructions on the label to ensure safe and effective use of the product. Many products recommend keeping the surface wet for several minutes to ensure germs are killed. Many also recommend precautions such as wearing gloves and making sure you have good ventilation during use of the product.  Use a product from EPAs List N: Disinfectants for Coronavirus (COVID-19)external icon.  Complete Disinfection Guidance      PLEASE READ YOUR DISCHARGE INSTRUCTIONS ENTIRELY AS IT CONTAINS IMPORTANT INFORMATION.    Please drink plenty of fluids.    Please get plenty of rest.    If not allergic, please take over the counter Tylenol (Acetaminophen) and/or Motrin (Ibuprofen) as directed for control of muscle aches, pain, and/or fever.    Please go to the Emergency Department for any shortness of breath or difficulty breathing.    For congestion, runny nose, or post nasal drip please take an over the counter antihistamine medication (Claritin/Zyrtec/Allegra) of your choice as directed or try an over the counter decongestant like Sudafed. You buy this behind the pharmacy counter.  You can also buy combination OTC antihistamine plus decongestant medications such at Zyrtec-D or Claritin-D.  Do not take decongestant if you suffer from high blood pressure.    For cough, you may be prescribed medication.  Take as prescribed.  If you were not prescribed any medication, you can use over the counter cough medications such as Robitussin.  If you have chest congestion you can consider using over the counter Mucinex but make sure you drink plenty of water to encourage mobilization of the secretions.    If you do have high blood pressure or palpitations, it is safe to take Coricidin HBP for relief of sinus symptoms.    Sore throat recommendations: Warm fluids (tea with honey, soup, broth),  warm salt water gargles, throat lozenges, rest, hydration.    If you  smoke, please stop smoking.    You must understand that you've received an Urgent Care treatment only and that you may be released before all of your medical problems are known or treated. You, the patient, will arrange for follow up as instructed. If your symptoms worsen or fail to improve you should go to the Emergency Room.      You have tested positive for COVID-19 today.      ISOLATION  If you tested positive and do not have symptoms, you must isolate for 5 days starting on the day of the positive test. I    If you tested positive and have symptoms, you must isolate for 5 days starting on the day of the first symptoms,  not the day of the positive test.     This is the most important part, both the CDC and the [facility] emphasize that you do not test out of isolation.     After 5 days, if your symptoms have improved and you have not had fever on day 5, you can return to the community on day 6- NO TESTING REQUIRED!      In fact, we do not retest if you were positive in the last 90 days.    After your 5 days of isolation are completed, the CDC recommends strict mask use for the first 5 days that you come out of isolation.  

## 2022-02-14 ENCOUNTER — OFFICE VISIT (OUTPATIENT)
Dept: HEMATOLOGY/ONCOLOGY | Facility: CLINIC | Age: 57
End: 2022-02-14
Payer: COMMERCIAL

## 2022-02-14 VITALS
DIASTOLIC BLOOD PRESSURE: 50 MMHG | BODY MASS INDEX: 19.43 KG/M2 | RESPIRATION RATE: 16 BRPM | WEIGHT: 109.69 LBS | HEIGHT: 63 IN | HEART RATE: 97 BPM | TEMPERATURE: 98 F | OXYGEN SATURATION: 98 % | SYSTOLIC BLOOD PRESSURE: 105 MMHG

## 2022-02-14 DIAGNOSIS — Z17.0 MALIGNANT NEOPLASM OF UPPER-OUTER QUADRANT OF RIGHT BREAST IN FEMALE, ESTROGEN RECEPTOR POSITIVE: Primary | ICD-10-CM

## 2022-02-14 DIAGNOSIS — C50.411 MALIGNANT NEOPLASM OF UPPER-OUTER QUADRANT OF RIGHT BREAST IN FEMALE, ESTROGEN RECEPTOR POSITIVE: Primary | ICD-10-CM

## 2022-02-14 PROCEDURE — 3074F PR MOST RECENT SYSTOLIC BLOOD PRESSURE < 130 MM HG: ICD-10-PCS | Mod: CPTII,S$GLB,, | Performed by: INTERNAL MEDICINE

## 2022-02-14 PROCEDURE — 3074F SYST BP LT 130 MM HG: CPT | Mod: CPTII,S$GLB,, | Performed by: INTERNAL MEDICINE

## 2022-02-14 PROCEDURE — 3078F DIAST BP <80 MM HG: CPT | Mod: CPTII,S$GLB,, | Performed by: INTERNAL MEDICINE

## 2022-02-14 PROCEDURE — 3078F PR MOST RECENT DIASTOLIC BLOOD PRESSURE < 80 MM HG: ICD-10-PCS | Mod: CPTII,S$GLB,, | Performed by: INTERNAL MEDICINE

## 2022-02-14 PROCEDURE — 3008F PR BODY MASS INDEX (BMI) DOCUMENTED: ICD-10-PCS | Mod: CPTII,S$GLB,, | Performed by: INTERNAL MEDICINE

## 2022-02-14 PROCEDURE — 99999 PR PBB SHADOW E&M-EST. PATIENT-LVL IV: ICD-10-PCS | Mod: PBBFAC,,, | Performed by: INTERNAL MEDICINE

## 2022-02-14 PROCEDURE — 99214 PR OFFICE/OUTPT VISIT, EST, LEVL IV, 30-39 MIN: ICD-10-PCS | Mod: S$GLB,,, | Performed by: INTERNAL MEDICINE

## 2022-02-14 PROCEDURE — 1159F MED LIST DOCD IN RCRD: CPT | Mod: CPTII,S$GLB,, | Performed by: INTERNAL MEDICINE

## 2022-02-14 PROCEDURE — 1159F PR MEDICATION LIST DOCUMENTED IN MEDICAL RECORD: ICD-10-PCS | Mod: CPTII,S$GLB,, | Performed by: INTERNAL MEDICINE

## 2022-02-14 PROCEDURE — 99999 PR PBB SHADOW E&M-EST. PATIENT-LVL IV: CPT | Mod: PBBFAC,,, | Performed by: INTERNAL MEDICINE

## 2022-02-14 PROCEDURE — 99214 OFFICE O/P EST MOD 30 MIN: CPT | Mod: S$GLB,,, | Performed by: INTERNAL MEDICINE

## 2022-02-14 PROCEDURE — 3008F BODY MASS INDEX DOCD: CPT | Mod: CPTII,S$GLB,, | Performed by: INTERNAL MEDICINE

## 2022-02-14 RX ORDER — CITALOPRAM 40 MG/1
40 TABLET, FILM COATED ORAL DAILY
Qty: 90 TABLET | Refills: 3 | Status: SHIPPED | OUTPATIENT
Start: 2022-02-14 | End: 2023-01-26 | Stop reason: SDUPTHER

## 2022-02-14 NOTE — PROGRESS NOTES
[facility]/ [facility]   at [facility] Note:      Chief Complaint:   Encounter Diagnosis   Name Primary?    Malignant neoplasm of upper-outer quadrant of right breast in female, estrogen receptor positive Yes       Cancer Staging  Malignant neoplasm of breast  Staging form: Breast, AJCC 8th Edition  - Clinical stage from 11/22/2011: Stage IIA (cT2, cN1mi, cM0, G2, ER+, NM+, HER2-) - Signed by Kateryna Mccray MD on 7/26/2021      HPI:  Gwendolyn Abraham is a 56 y.o. female who presents today for evaluation of breast cancer. She has been doing well and continues on Letrozole. She still has joint aches but they are well managed.   She has been diagnosed with vaginal lichen sclerosis and dysparuenia     Oncology History:  Oncologic history begins back in Nov 2011.   She found a lump in her right breast, mammogram confirmed the abnormality.   Seen by Dr Ma. Biopsy done 11/6/11: infiltrating ductal carcinoma.   11/22/11: bilateral mastectomies.  Right breast pathology: 4.5cm lobular carcinoma, foci of vascular invasion noted. The subepithelial nipple positive for tumor.   SLN+ individual and minute groups of metastatic tumor cells.  largest metastasis 0.3mm (just over 200 cells) ER >95%/NM 90%/Ki67=15%/ Her2-  Left breast: no malignancy  Pathologic staging was [id number]  Following recovery from surgery she had a left portacath placed and was started on chemotherapy.  She received 12 weekly cycles of Taxol followed by 4 cycles of AC.   Following chemotherapy she received radiation therapy to the chest wall, axilla and supraclavicular area. Radiation therapy was completed 7/30/12.  She has been on tamoxifen since completing XRT (started Tamoxifen 8/2012)  Patient was still actively menstruating at the time of her diagnosis     Patient had her second breast procedure Jan 24th-second part of the reconstruction.  Repeat mammogram and US - fat necrosis Kvng  "2015  Reconstruction done by Brian     4/14/15: CA 27-29 = 15  8/12/15: CA 27-29 = 15  8/5/16: CA 27-29 = 16  8/2017 switched from Tamoxifen to letrozole discussed 10 years of therapy for Stage II breast cancer    Now seeing Dr Willard Meyer for Prolia injection          Patient Active Problem List   Diagnosis    Asthma    Malignant neoplasm of breast    Atrophic vaginitis       Review of Systems:   Review of Systems   Constitutional: Negative.    HENT: Negative.    Respiratory: Negative.    Cardiovascular: Negative.    Gastrointestinal: Negative.    Musculoskeletal: Positive for joint pain. Negative for falls.   Neurological: Negative.    Endo/Heme/Allergies: Negative.    All other systems reviewed and are negative.      PHYSICAL EXAM:  BP (!) 105/50 (BP Location: Left arm, Patient Position: Sitting, BP Method: Large (Automatic))   Pulse 97   Temp 98.3 °F (36.8 °C) (Oral)   Resp 16   Ht 5' 3" (1.6 m)   Wt 49.7 kg (109 lb 10.9 oz)   SpO2 98%   BMI 19.43 kg/m²     General Appearance:    Alert, cooperative, no distress, appears stated age   Lungs:     Clear to auscultation bilaterally, respirations unlabored    Heart:    Regular rate and rhythm, S1 and S2 normal   Breast Exam:    Bilateral reconstruction, No tenderness, masses, or nipple abnormality   Abdomen:     Soft, non-tender, bowel sounds active, no masses, no organomegaly   Extremities:   Extremities normal, atraumatic, no cyanosis or edema   Pulses:   2+ and symmetric all extremities   Skin:   Skin color, texture, turgor normal, no rashes or lesions   Lymph nodes:   Cervical, supraclavicular, and axillary nodes normal   Neurologic:   CNII-XII intact, normal strength, sensation and reflexes     throughout           Pertinent Labs & Imaging:      No results found for this or any previous visit (from the past 24 hour(s)).    Assessment & Plan:    1. Malignant neoplasm of upper-outer quadrant of right breast in female, estrogen receptor positive  - " citalopram (CELEXA) 40 MG tablet; Take 1 tablet (40 mg total) by mouth once daily.  Dispense: 90 tablet; Refill: 3       Overall patient is doing well. No new issues on letrozole or Prolia.   Continue to follow with gynecology for dyspareunia and lichen sclerosis. Discussed intrarosa. Data is limited  Continue on Prolia q6mos with Dr Meyer  Continues to tolerate letrozole well, continue until 8/2022  RTC 6 mos as scheduled    Per NCCN Guidelines, breast cancer patients should be followed every 3-12 months for the first five years and then annually thereafter with annual mammography if mastectomy or breast reconstruction was not undertaken. At this time, there is no indication for routine laboratory or imaging in the surveillance for metastatic disease, unless clinical signs or symptoms arise.     Women on Aromatase inhibitors should have baseline bone density testing and at least biannually thereafter.     Healthy diet, low alcohol intake, and an active lifestyle, with a goal of an ideal BMI (20-25) is also encouraged to reduce the risk of breast cancer occurrences and recurrences, as well as improve side effects to anti-estrogen medications      Med Onc Chart Routing      Follow up with physician 6 months.   Follow up with [facility]    Labs    Imaging    Pharmacy appointment    Other referrals            Total time of this visit, including time spent face to face with patient and/or via video/audio, and also in preparing for today's visit for MDM and documentation. (Medical Decision Making, including consideration of possible diagnoses, management options, complex medical record review, review of diagnostic tests and information, consideration and discussion of significant complications based on comorbidities, and discussion with providers involved with the care of the patient) 45 minutes. Greater than 50% was spent face to face with the patient counseling and coordinating care.      Kateryna Mccray,  MD  02/14/2022

## 2022-05-13 DIAGNOSIS — Z17.0 MALIGNANT NEOPLASM OF UPPER-OUTER QUADRANT OF RIGHT BREAST IN FEMALE, ESTROGEN RECEPTOR POSITIVE: Primary | ICD-10-CM

## 2022-05-13 DIAGNOSIS — C50.411 MALIGNANT NEOPLASM OF UPPER-OUTER QUADRANT OF RIGHT BREAST IN FEMALE, ESTROGEN RECEPTOR POSITIVE: Primary | ICD-10-CM

## 2022-05-13 NOTE — TELEPHONE ENCOUNTER
"----- Message from Mirna Abdi sent at 5/13/2022  1:24 PM CDT -----  RX Name and Strength: letrozole (FEMARA) 2.5 mg Tab     How is the patient currently taking it?          Is this a 30 day or 90 day Rx? 90 day supply         Preferred Pharmacy with phone number:     [facility] [place] - [address]  [address]  [place] [address]  Phone: [phone] Fax: [phone]             Local or Mail Order: local         Ordering Provider: Dr Mccray         Contact Preference:                    Additional Information:  "Thank you for all that you do for our patients"     "

## 2022-05-16 RX ORDER — LETROZOLE 2.5 MG/1
2.5 TABLET, FILM COATED ORAL DAILY
Qty: 90 TABLET | Refills: 3 | Status: SHIPPED | OUTPATIENT
Start: 2022-05-16

## 2022-08-26 ENCOUNTER — OFFICE VISIT (OUTPATIENT)
Dept: HEMATOLOGY/ONCOLOGY | Facility: CLINIC | Age: 57
End: 2022-08-26
Payer: COMMERCIAL

## 2022-08-26 VITALS
WEIGHT: 110.81 LBS | HEART RATE: 75 BPM | BODY MASS INDEX: 19.63 KG/M2 | RESPIRATION RATE: 18 BRPM | DIASTOLIC BLOOD PRESSURE: 56 MMHG | OXYGEN SATURATION: 100 % | HEIGHT: 63 IN | TEMPERATURE: 99 F | SYSTOLIC BLOOD PRESSURE: 110 MMHG

## 2022-08-26 DIAGNOSIS — C50.411 MALIGNANT NEOPLASM OF UPPER-OUTER QUADRANT OF RIGHT BREAST IN FEMALE, ESTROGEN RECEPTOR POSITIVE: Primary | ICD-10-CM

## 2022-08-26 DIAGNOSIS — Z17.0 MALIGNANT NEOPLASM OF UPPER-OUTER QUADRANT OF RIGHT BREAST IN FEMALE, ESTROGEN RECEPTOR POSITIVE: Primary | ICD-10-CM

## 2022-08-26 PROCEDURE — 99999 PR PBB SHADOW E&M-EST. PATIENT-LVL III: ICD-10-PCS | Mod: PBBFAC,,, | Performed by: INTERNAL MEDICINE

## 2022-08-26 PROCEDURE — 3074F SYST BP LT 130 MM HG: CPT | Mod: CPTII,S$GLB,, | Performed by: INTERNAL MEDICINE

## 2022-08-26 PROCEDURE — 3008F BODY MASS INDEX DOCD: CPT | Mod: CPTII,S$GLB,, | Performed by: INTERNAL MEDICINE

## 2022-08-26 PROCEDURE — 1159F MED LIST DOCD IN RCRD: CPT | Mod: CPTII,S$GLB,, | Performed by: INTERNAL MEDICINE

## 2022-08-26 PROCEDURE — 99214 OFFICE O/P EST MOD 30 MIN: CPT | Mod: S$GLB,,, | Performed by: INTERNAL MEDICINE

## 2022-08-26 PROCEDURE — 3078F DIAST BP <80 MM HG: CPT | Mod: CPTII,S$GLB,, | Performed by: INTERNAL MEDICINE

## 2022-08-26 PROCEDURE — 3074F PR MOST RECENT SYSTOLIC BLOOD PRESSURE < 130 MM HG: ICD-10-PCS | Mod: CPTII,S$GLB,, | Performed by: INTERNAL MEDICINE

## 2022-08-26 PROCEDURE — 3078F PR MOST RECENT DIASTOLIC BLOOD PRESSURE < 80 MM HG: ICD-10-PCS | Mod: CPTII,S$GLB,, | Performed by: INTERNAL MEDICINE

## 2022-08-26 PROCEDURE — 99214 PR OFFICE/OUTPT VISIT, EST, LEVL IV, 30-39 MIN: ICD-10-PCS | Mod: S$GLB,,, | Performed by: INTERNAL MEDICINE

## 2022-08-26 PROCEDURE — 99999 PR PBB SHADOW E&M-EST. PATIENT-LVL III: CPT | Mod: PBBFAC,,, | Performed by: INTERNAL MEDICINE

## 2022-08-26 PROCEDURE — 3008F PR BODY MASS INDEX (BMI) DOCUMENTED: ICD-10-PCS | Mod: CPTII,S$GLB,, | Performed by: INTERNAL MEDICINE

## 2022-08-26 PROCEDURE — 1159F PR MEDICATION LIST DOCUMENTED IN MEDICAL RECORD: ICD-10-PCS | Mod: CPTII,S$GLB,, | Performed by: INTERNAL MEDICINE

## 2022-08-26 RX ORDER — DENOSUMAB 60 MG/ML
INJECTION SUBCUTANEOUS
COMMUNITY
Start: 2022-06-08

## 2022-08-26 RX ORDER — TACROLIMUS 1 MG/G
OINTMENT TOPICAL
COMMUNITY
Start: 2022-05-27

## 2022-08-26 RX ORDER — ALBUTEROL SULFATE 90 UG/1
AEROSOL, METERED RESPIRATORY (INHALATION)
COMMUNITY
Start: 2022-04-12

## 2022-08-26 RX ORDER — PRASTERONE 6.5 MG/1
INSERT VAGINAL
COMMUNITY
Start: 2022-03-01

## 2022-08-26 NOTE — PROGRESS NOTES
[facility]/ [facility]   at [facility] Note:      Chief Complaint:   Encounter Diagnosis   Name Primary?    Malignant neoplasm of upper-outer quadrant of right breast in female, estrogen receptor positive Yes       Cancer Staging  Malignant neoplasm of breast  Staging form: Breast, AJCC 8th Edition  - Clinical stage from 11/22/2011: Stage IIA (cT2, cN1mi, cM0, G2, ER+, [place]+, HER2-) - Signed by Kateryna Mccray MD on 7/26/2021      HPI:  Gwendolyn Abraham is a 57 y.o. female who presents today for evaluation of breast cancer. She has been doing well and continues on Letrozole. She still has joint aches but they are well managed.   Vaginal lichen sclerosis and dysparuenia are improved greatly with treatment with Dr Heide Cevallos    Oncology History:  Oncologic history begins back in Nov 2011.   She found a lump in her right breast, mammogram confirmed the abnormality.   Seen by Dr Ma. Biopsy done 11/6/11: infiltrating ductal carcinoma.   11/22/11: bilateral mastectomies.  Right breast pathology: 4.5cm lobular carcinoma, foci of vascular invasion noted. The subepithelial nipple positive for tumor.   SLN+ individual and minute groups of metastatic tumor cells.  largest metastasis 0.3mm (just over 200 cells) ER >95%/[place] 90%/Ki67=15%/ Her2-  Left breast: no malignancy  Pathologic staging was [id number]  Following recovery from surgery she had a left portacath placed and was started on chemotherapy.  She received 12 weekly cycles of Taxol followed by 4 cycles of AC.   Following chemotherapy she received radiation therapy to the chest wall, axilla and supraclavicular area. Radiation therapy was completed 7/30/12.  She has been on tamoxifen since completing XRT (started Tamoxifen 8/2012)  Patient was still actively menstruating at the time of her diagnosis     Patient had her second breast procedure Jan 24th-second part of the reconstruction.  Repeat mammogram  "and US - fat necrosis Jan 2015  Reconstruction done by Brian     4/14/15: CA 27-29 = 15  8/12/15: CA 27-29 = 15  8/5/16: CA 27-29 = 16  8/2017 switched from Tamoxifen to letrozole discussed 10 years of therapy for Stage II breast cancer    Now seeing Dr Willard Meyer for Prolia injection          Patient Active Problem List   Diagnosis    Asthma    Malignant neoplasm of breast    Atrophic vaginitis       Review of Systems:   Review of Systems   Constitutional: Negative.    HENT: Negative.    Respiratory: Negative.  Negative for cough and shortness of breath.    Cardiovascular: Negative.  Negative for chest pain.   Gastrointestinal: Negative.  Negative for abdominal pain and diarrhea.   Genitourinary: Negative for frequency.   Musculoskeletal: Positive for joint pain. Negative for back pain and falls.   Skin: Negative for rash.   Neurological: Negative.    Endo/Heme/Allergies: Negative.    Psychiatric/Behavioral: The patient is not nervous/anxious.    All other systems reviewed and are negative.      PHYSICAL EXAM:  BP (!) 110/56   Pulse 75   Temp 98.7 °F (37.1 °C) (Oral)   Resp 18   Ht 5' 3" (1.6 m)   Wt 50.3 kg (110 lb 12.5 oz)   SpO2 100%   BMI 19.62 kg/m²     General Appearance:    Alert, cooperative, no distress, appears stated age   Lungs:     Clear to auscultation bilaterally, respirations unlabored    Heart:    Regular rate and rhythm, S1 and S2 normal   Breast Exam:    Bilateral reconstruction, No tenderness, masses, or nipple abnormality   Abdomen:     Soft, non-tender, bowel sounds active, no masses, no organomegaly   Extremities:   Extremities normal, atraumatic, no cyanosis or edema   Pulses:   2+ and symmetric all extremities   Skin:   Skin color, texture, turgor normal, no rashes or lesions   Lymph nodes:   Cervical, supraclavicular, and axillary nodes normal   Neurologic:   CNII-XII intact, normal strength, sensation and reflexes     throughout           Pertinent Labs & Imaging:      No " results found for this or any previous visit (from the past 24 hour(s)).    Assessment & Plan:    1. Malignant neoplasm of upper-outer quadrant of right breast in female, estrogen receptor positive       Overall patient is doing well. No new issues on letrozole or Prolia.   Continue to follow with gynecology for dyspareunia and lichen sclerosis. Ok for testosterone cream  Continue on Prolia q6mos with Dr Meyer  Completes letrozole as she has been on it 10 year 8/2012 - 8/2022  RTC 6 mos as scheduled then will transition to annual visit     Per NCCN Guidelines, breast cancer patients should be followed every 3-12 months for the first five years and then annually thereafter with annual mammography if mastectomy or breast reconstruction was not undertaken. At this time, there is no indication for routine laboratory or imaging in the surveillance for metastatic disease, unless clinical signs or symptoms arise.     Women on Aromatase inhibitors should have baseline bone density testing and at least biannually thereafter.     Healthy diet, low alcohol intake, and an active lifestyle, with a goal of an ideal BMI (20-25) is also encouraged to reduce the risk of breast cancer occurrences and recurrences, as well as improve side effects to anti-estrogen medications      Med Onc Chart Routing      Follow up with physician 6 months.   Follow up with [facility]    Infusion scheduling note    Injection scheduling note    Labs    Imaging    Pharmacy appointment    Other referrals            Total time of this visit, including time spent face to face with patient and/or via video/audio, and also in preparing for today's visit for MDM and documentation. (Medical Decision Making, including consideration of possible diagnoses, management options, complex medical record review, review of diagnostic tests and information, consideration and discussion of significant complications based on comorbidities, and discussion with providers involved  with the care of the patient) 30 minutes. Greater than 50% was spent face to face with the patient counseling and coordinating care.      Kateryna Mccray MD  08/26/2022

## 2022-09-19 ENCOUNTER — OFFICE VISIT (OUTPATIENT)
Dept: URGENT CARE | Facility: CLINIC | Age: 57
End: 2022-09-19
Payer: COMMERCIAL

## 2022-09-19 VITALS
SYSTOLIC BLOOD PRESSURE: 104 MMHG | RESPIRATION RATE: 17 BRPM | TEMPERATURE: 98 F | HEART RATE: 122 BPM | WEIGHT: 110 LBS | OXYGEN SATURATION: 97 % | HEIGHT: 63 IN | DIASTOLIC BLOOD PRESSURE: 75 MMHG | BODY MASS INDEX: 19.49 KG/M2

## 2022-09-19 DIAGNOSIS — J06.9 BACTERIAL URI: Primary | ICD-10-CM

## 2022-09-19 DIAGNOSIS — B96.89 BACTERIAL URI: Primary | ICD-10-CM

## 2022-09-19 DIAGNOSIS — R50.9 FEVER, UNSPECIFIED FEVER CAUSE: ICD-10-CM

## 2022-09-19 LAB
CTP QC/QA: YES
CTP QC/QA: YES
POC MOLECULAR INFLUENZA A AGN: NEGATIVE
POC MOLECULAR INFLUENZA B AGN: NEGATIVE
SARS-COV-2 RDRP RESP QL NAA+PROBE: NEGATIVE

## 2022-09-19 PROCEDURE — 99213 PR OFFICE/OUTPT VISIT, EST, LEVL III, 20-29 MIN: ICD-10-PCS | Mod: S$GLB,,, | Performed by: PHYSICIAN ASSISTANT

## 2022-09-19 PROCEDURE — 3074F SYST BP LT 130 MM HG: CPT | Mod: CPTII,S$GLB,, | Performed by: PHYSICIAN ASSISTANT

## 2022-09-19 PROCEDURE — 1159F PR MEDICATION LIST DOCUMENTED IN MEDICAL RECORD: ICD-10-PCS | Mod: CPTII,S$GLB,, | Performed by: PHYSICIAN ASSISTANT

## 2022-09-19 PROCEDURE — 1160F RVW MEDS BY RX/DR IN RCRD: CPT | Mod: CPTII,S$GLB,, | Performed by: PHYSICIAN ASSISTANT

## 2022-09-19 PROCEDURE — 1160F PR REVIEW ALL MEDS BY PRESCRIBER/CLIN PHARMACIST DOCUMENTED: ICD-10-PCS | Mod: CPTII,S$GLB,, | Performed by: PHYSICIAN ASSISTANT

## 2022-09-19 PROCEDURE — U0002 COVID-19 LAB TEST NON-CDC: HCPCS | Mod: QW,S$GLB,, | Performed by: PHYSICIAN ASSISTANT

## 2022-09-19 PROCEDURE — 3074F PR MOST RECENT SYSTOLIC BLOOD PRESSURE < 130 MM HG: ICD-10-PCS | Mod: CPTII,S$GLB,, | Performed by: PHYSICIAN ASSISTANT

## 2022-09-19 PROCEDURE — 71046 X-RAY EXAM CHEST 2 VIEWS: CPT | Mod: FY,S$GLB,, | Performed by: RADIOLOGY

## 2022-09-19 PROCEDURE — U0002: ICD-10-PCS | Mod: QW,S$GLB,, | Performed by: PHYSICIAN ASSISTANT

## 2022-09-19 PROCEDURE — 71046 XR CHEST PA AND LATERAL: ICD-10-PCS | Mod: FY,S$GLB,, | Performed by: RADIOLOGY

## 2022-09-19 PROCEDURE — 87502 INFLUENZA DNA AMP PROBE: CPT | Mod: QW,S$GLB,, | Performed by: PHYSICIAN ASSISTANT

## 2022-09-19 PROCEDURE — 3008F PR BODY MASS INDEX (BMI) DOCUMENTED: ICD-10-PCS | Mod: CPTII,S$GLB,, | Performed by: PHYSICIAN ASSISTANT

## 2022-09-19 PROCEDURE — 1159F MED LIST DOCD IN RCRD: CPT | Mod: CPTII,S$GLB,, | Performed by: PHYSICIAN ASSISTANT

## 2022-09-19 PROCEDURE — 3008F BODY MASS INDEX DOCD: CPT | Mod: CPTII,S$GLB,, | Performed by: PHYSICIAN ASSISTANT

## 2022-09-19 PROCEDURE — 99213 OFFICE O/P EST LOW 20 MIN: CPT | Mod: S$GLB,,, | Performed by: PHYSICIAN ASSISTANT

## 2022-09-19 PROCEDURE — 3078F DIAST BP <80 MM HG: CPT | Mod: CPTII,S$GLB,, | Performed by: PHYSICIAN ASSISTANT

## 2022-09-19 PROCEDURE — 87502 POCT INFLUENZA A/B MOLECULAR: ICD-10-PCS | Mod: QW,S$GLB,, | Performed by: PHYSICIAN ASSISTANT

## 2022-09-19 PROCEDURE — 3078F PR MOST RECENT DIASTOLIC BLOOD PRESSURE < 80 MM HG: ICD-10-PCS | Mod: CPTII,S$GLB,, | Performed by: PHYSICIAN ASSISTANT

## 2022-09-19 RX ORDER — PROMETHAZINE HYDROCHLORIDE AND DEXTROMETHORPHAN HYDROBROMIDE 6.25; 15 MG/5ML; MG/5ML
5 SYRUP ORAL EVERY 4 HOURS PRN
Qty: 118 ML | Refills: 0 | Status: SHIPPED | OUTPATIENT
Start: 2022-09-19 | End: 2022-09-29

## 2022-09-19 RX ORDER — FLUCONAZOLE 150 MG/1
150 TABLET ORAL DAILY
Qty: 1 TABLET | Refills: 0 | Status: SHIPPED | OUTPATIENT
Start: 2022-09-19 | End: 2022-09-20

## 2022-09-19 RX ORDER — DOXYCYCLINE 100 MG/1
100 CAPSULE ORAL 2 TIMES DAILY
Qty: 14 CAPSULE | Refills: 0 | Status: SHIPPED | OUTPATIENT
Start: 2022-09-19

## 2022-09-19 NOTE — PATIENT INSTRUCTIONS
You must understand that you've received an Urgent Care treatment only and that you may be released before all your medical problems are known or treated. You, the patient, will arrange for follow up care as instructed.      Follow up with your PCP or specialty clinic as instructed in the next 2-3 days if not improved or as needed. You can call [phone] to schedule an appointment with appropriate provider.      If you condition worsens, we recommend that you receive another evaluation at the emergency room immediately or contact your primary medical clinic's after hours call service to discuss your concerns.      Please return here or go to the Emergency Department for any concerns or worsening condition.      If you were prescribed a narcotic or controlled substance, do not drive or operate heavy equipment or machinery while taking these medications.

## 2022-09-19 NOTE — PROGRESS NOTES
"Subjective:       Patient ID: Gwendolyn Abraham is a 57 y.o. female.    Vitals:  height is 5' 3" (1.6 m) and weight is 49.9 kg (110 lb). Her temperature is 98.2 °F (36.8 °C). Her blood pressure is 104/75 and her pulse is 122 (abnormal). Her respiration is 17 and oxygen saturation is 97%.     Chief Complaint: Sore Throat    Patient presents to the clinic with a sore throat, headache, fever, cough x a week     Patient provider note starts here:  Patient presents with a 7 day history of nasal congestion, sore throat, cough, generalized malaise and body aches. Reports fever 101.7 this morning and took Tylenol early this morning for it. Reports some chest soreness from coughing and some SOB with ambulation. Taking Delsym without significant relief. She is concerned for pneumonia since she has been having symptoms for the past week. Denies known ill contacts. Of note, she had a steroid injection last week for these symptoms which has not given her relief of the symptoms.     Sore Throat   This is a new problem. The current episode started in the past 7 days. The problem has been gradually worsening. Sore throat worse side: both. The pain is at a severity of 3/10. The pain is moderate. Associated symptoms include congestion, coughing, headaches, a hoarse voice, shortness of breath (with ambulation) and swollen glands. Pertinent negatives include no abdominal pain, diarrhea, ear discharge, ear pain, neck pain, trouble swallowing (Painful swallowing but tolerating secretions well) or vomiting. Treatments tried: delsym. The treatment provided mild relief.     Constitution: Negative for fever.   HENT:  Positive for congestion, postnasal drip and sore throat. Negative for ear pain, ear discharge and trouble swallowing (Painful swallowing but tolerating secretions well).    Neck: Negative for neck pain.   Cardiovascular:  Negative for chest pain, palpitations and sob on exertion.   Respiratory:  Positive for cough, sputum " production and shortness of breath (with ambulation). Negative for chest tightness and wheezing.    Gastrointestinal:  Negative for abdominal pain, vomiting and diarrhea.   Skin:  Negative for color change and wound.   Neurological:  Positive for headaches. Negative for numbness and tingling.     Objective:      Physical Exam   Constitutional: She is oriented to person, place, and time. She appears well-developed. She is cooperative.  Non-toxic appearance. She does not appear ill. No distress.   HENT:   Head: Normocephalic and atraumatic.   Ears:   Right Ear: Hearing, tympanic membrane, external ear and ear canal normal.   Left Ear: Hearing, tympanic membrane, external ear and ear canal normal.   Nose: Congestion present. No mucosal edema, rhinorrhea or nasal deformity. No epistaxis. Right sinus exhibits no maxillary sinus tenderness and no frontal sinus tenderness. Left sinus exhibits no maxillary sinus tenderness and no frontal sinus tenderness.   Mouth/Throat: Uvula is midline, oropharynx is clear and moist and mucous membranes are normal. No trismus in the jaw. Normal dentition. No uvula swelling. No oropharyngeal exudate, posterior oropharyngeal edema or posterior oropharyngeal erythema.   Eyes: Conjunctivae and lids are normal. No scleral icterus.   Neck: Trachea normal and phonation normal. Neck supple. No edema present. No erythema present. No neck rigidity present.   Cardiovascular: Normal rate, regular rhythm, normal heart sounds and normal pulses.   Pulmonary/Chest: Effort normal and breath sounds normal. No respiratory distress. She has no decreased breath sounds. She has no wheezes. She has no rhonchi.   Abdominal: Normal appearance.   Musculoskeletal: Normal range of motion.         General: No deformity. Normal range of motion.   Neurological: She is alert and oriented to person, place, and time. She exhibits normal muscle tone. Coordination normal.   Skin: Skin is warm, dry, intact, not diaphoretic  and not pale.   Psychiatric: Her speech is normal and behavior is normal. Judgment and thought content normal.   Nursing note and vitals reviewed.      Assessment:       1. Bacterial URI    2. Fever, unspecified fever cause          Results for orders placed or performed in visit on 09/19/22   POCT COVID-19 Rapid Screening   Result Value Ref Range    POC Rapid COVID Negative Negative     Acceptable Yes    POCT Influenza A/B MOLECULAR   Result Value Ref Range    POC Molecular Influenza A Ag Negative Negative, Not Reported    POC Molecular Influenza B Ag Negative Negative, Not Reported     Acceptable Yes      XR CHEST PA AND LATERAL  Result Date: 9/19/2022  EXAMINATION: XR CHEST PA AND LATERAL CLINICAL HISTORY: Fever, unspecified TECHNIQUE: PA and lateral views of the chest were performed. COMPARISON: None FINDINGS: Increased lucency is noted bilaterally compatible with COPD.  Cardiac silhouette is not enlarged.  There is bilateral hilar retraction and apical reticulonodular opacities.  Pleural plaques are also suspected bilaterally over the upper lungs.  Surgical clips overlie the vahid bilaterally.   No convincing acute abnormality. Bilateral pleural plaques and upper lung scarring. COPD. Electronically signed by: Vicky Olmos Date: 09/19/2022 Time: 12:21       Plan:         Bacterial URI  -     doxycycline (VIBRAMYCIN) 100 MG Cap; Take 1 capsule (100 mg total) by mouth 2 (two) times daily.  Dispense: 14 capsule; Refill: 0  -     promethazine-dextromethorphan (PROMETHAZINE-DM) 6.25-15 mg/5 mL Syrp; Take 5 mLs by mouth every 4 (four) hours as needed (Cough).  Dispense: 118 mL; Refill: 0  -     fluconazole (DIFLUCAN) 150 MG Tab; Take 1 tablet (150 mg total) by mouth once daily. for 1 day  Dispense: 1 tablet; Refill: 0    Fever, unspecified fever cause  -     POCT COVID-19 Rapid Screening  -     POCT Influenza A/B MOLECULAR  -     XR CHEST PA AND LATERAL; Future; Expected date:  09/19/2022         Medical Decision Making:   History:   Old Medical Records: I decided to obtain old medical records.  Differential Diagnosis:   Differential diagnosis includes but is not limited to: viral vs bacterial URI, pharyngitis, otitis, COVID 19, influenza, pneumonia.    Independently Interpreted Test(s):   I have ordered and independently interpreted X-rays - see summary below.       <> Summary of X-Ray Reading(s): Plain films reviewed and interpreted by me and negative for acute process    Clinical Tests:   Lab Tests: Ordered and Reviewed       <> Summary of Lab: COVID negative  Flu negative  Urgent Care Management:  Patient presents with complaints of cough in addition to other URI like symptoms which have been present for the past 7 days.  On exam, she is afebrile and nontoxic appearing.  Lungs are clear to auscultation bilaterally and vital signs are stable.  COVID and flu are both negative.  Chest x-ray shows no acute consolidation.  Covering with antibiotics due to the length since onset of symptoms in addition to promethazine to help with cough.  She was advised strict follow-up with primary care, ED precautions.  She verbalized understanding and agreed with plan.     Patient Instructions   You must understand that you've received an Urgent Care treatment only and that you may be released before all your medical problems are known or treated. You, the patient, will arrange for follow up care as instructed.      Follow up with your PCP or specialty clinic as instructed in the next 2-3 days if not improved or as needed. You can call [phone] to schedule an appointment with appropriate provider.      If you condition worsens, we recommend that you receive another evaluation at the emergency room immediately or contact your primary medical clinic's after hours call service to discuss your concerns.      Please return here or go to the Emergency Department for any concerns or worsening condition.       If you were prescribed a narcotic or controlled substance, do not drive or operate heavy equipment or machinery while taking these medications.

## 2023-02-10 ENCOUNTER — OFFICE VISIT (OUTPATIENT)
Dept: HEMATOLOGY/ONCOLOGY | Facility: CLINIC | Age: 58
End: 2023-02-10
Payer: COMMERCIAL

## 2023-02-10 VITALS
HEART RATE: 89 BPM | SYSTOLIC BLOOD PRESSURE: 118 MMHG | WEIGHT: 115.44 LBS | TEMPERATURE: 98 F | DIASTOLIC BLOOD PRESSURE: 58 MMHG | HEIGHT: 62 IN | BODY MASS INDEX: 21.24 KG/M2 | RESPIRATION RATE: 18 BRPM | OXYGEN SATURATION: 98 %

## 2023-02-10 DIAGNOSIS — Z17.0 MALIGNANT NEOPLASM OF UPPER-OUTER QUADRANT OF RIGHT BREAST IN FEMALE, ESTROGEN RECEPTOR POSITIVE: Primary | ICD-10-CM

## 2023-02-10 DIAGNOSIS — C50.411 MALIGNANT NEOPLASM OF UPPER-OUTER QUADRANT OF RIGHT BREAST IN FEMALE, ESTROGEN RECEPTOR POSITIVE: Primary | ICD-10-CM

## 2023-02-10 PROCEDURE — 3008F BODY MASS INDEX DOCD: CPT | Mod: CPTII,S$GLB,, | Performed by: INTERNAL MEDICINE

## 2023-02-10 PROCEDURE — 3008F PR BODY MASS INDEX (BMI) DOCUMENTED: ICD-10-PCS | Mod: CPTII,S$GLB,, | Performed by: INTERNAL MEDICINE

## 2023-02-10 PROCEDURE — 1159F MED LIST DOCD IN RCRD: CPT | Mod: CPTII,S$GLB,, | Performed by: INTERNAL MEDICINE

## 2023-02-10 PROCEDURE — 1159F PR MEDICATION LIST DOCUMENTED IN MEDICAL RECORD: ICD-10-PCS | Mod: CPTII,S$GLB,, | Performed by: INTERNAL MEDICINE

## 2023-02-10 PROCEDURE — 99999 PR PBB SHADOW E&M-EST. PATIENT-LVL IV: CPT | Mod: PBBFAC,,, | Performed by: INTERNAL MEDICINE

## 2023-02-10 PROCEDURE — 99214 OFFICE O/P EST MOD 30 MIN: CPT | Mod: S$GLB,,, | Performed by: INTERNAL MEDICINE

## 2023-02-10 PROCEDURE — 3074F PR MOST RECENT SYSTOLIC BLOOD PRESSURE < 130 MM HG: ICD-10-PCS | Mod: CPTII,S$GLB,, | Performed by: INTERNAL MEDICINE

## 2023-02-10 PROCEDURE — 99999 PR PBB SHADOW E&M-EST. PATIENT-LVL IV: ICD-10-PCS | Mod: PBBFAC,,, | Performed by: INTERNAL MEDICINE

## 2023-02-10 PROCEDURE — 99214 PR OFFICE/OUTPT VISIT, EST, LEVL IV, 30-39 MIN: ICD-10-PCS | Mod: S$GLB,,, | Performed by: INTERNAL MEDICINE

## 2023-02-10 PROCEDURE — 3078F DIAST BP <80 MM HG: CPT | Mod: CPTII,S$GLB,, | Performed by: INTERNAL MEDICINE

## 2023-02-10 PROCEDURE — 3074F SYST BP LT 130 MM HG: CPT | Mod: CPTII,S$GLB,, | Performed by: INTERNAL MEDICINE

## 2023-02-10 PROCEDURE — 3078F PR MOST RECENT DIASTOLIC BLOOD PRESSURE < 80 MM HG: ICD-10-PCS | Mod: CPTII,S$GLB,, | Performed by: INTERNAL MEDICINE

## 2023-02-10 RX ORDER — BACLOFEN 10 MG/1
10 TABLET ORAL
COMMUNITY
Start: 2023-01-12

## 2023-02-10 RX ORDER — METHYLPREDNISOLONE 4 MG/1
TABLET ORAL
COMMUNITY
Start: 2023-02-06

## 2023-02-10 NOTE — PROGRESS NOTES
[facility]/ [facility]   at [facility] Note:      Chief Complaint:   Encounter Diagnosis   Name Primary?    Malignant neoplasm of upper-outer quadrant of right breast in female, estrogen receptor positive Yes          Cancer Staging   Malignant neoplasm of breast  Staging form: Breast, AJCC 8th Edition  - Clinical stage from 11/22/2011: Stage IIA (cT2, cN1mi, cM0, G2, ER+, NM+, HER2-) - Signed by Kateryna Mccray MD on 7/26/2021      HPI:  Gwendolyn Abraham is a 57 y.o. female who presents today for evaluation of breast cancer. She has been doing well and is now off of Letrozole. She is doing really well.    Oncology History:  Oncologic history begins back in Nov 2011.   She found a lump in her right breast, mammogram confirmed the abnormality.   Seen by Dr Ma. Biopsy done 11/6/11: infiltrating ductal carcinoma.   11/22/11: bilateral mastectomies.  Right breast pathology: 4.5cm lobular carcinoma, foci of vascular invasion noted. The subepithelial nipple positive for tumor.   SLN+ individual and minute groups of metastatic tumor cells.  largest metastasis 0.3mm (just over 200 cells) ER >95%/NM 90%/Ki67=15%/ Her2-  Left breast: no malignancy  Pathologic staging was [id number]  Following recovery from surgery she had a left portacath placed and was started on chemotherapy.  She received 12 weekly cycles of Taxol followed by 4 cycles of AC.   Following chemotherapy she received radiation therapy to the chest wall, axilla and supraclavicular area. Radiation therapy was completed 7/30/12.  She has been on tamoxifen since completing XRT (started Tamoxifen 8/2012)  Patient was still actively menstruating at the time of her diagnosis     Patient had her second breast procedure Jan 24th-second part of the reconstruction.  Repeat mammogram and US - fat necrosis Jan 2015  Reconstruction done by Brian     4/14/15: CA 27-29 = 15  8/12/15: CA 27-29 = 15  8/5/16: CA  "27-29 = 16  8/2017 switched from Tamoxifen to letrozole discussed 10 years of therapy for Stage II breast cancer    Now seeing Dr Willard Meyer for Prolia injection          Patient Active Problem List   Diagnosis    Asthma    Malignant neoplasm of breast    Atrophic vaginitis       Review of Systems:   Review of Systems   Constitutional: Negative.    HENT: Negative.     Respiratory: Negative.  Negative for cough and shortness of breath.    Cardiovascular: Negative.  Negative for chest pain.   Gastrointestinal: Negative.  Negative for abdominal pain and diarrhea.   Genitourinary:  Negative for frequency.   Musculoskeletal:  Positive for joint pain. Negative for back pain and falls.   Skin:  Negative for rash.   Neurological: Negative.    Endo/Heme/Allergies: Negative.    Psychiatric/Behavioral:  The patient is not nervous/anxious.    All other systems reviewed and are negative.    PHYSICAL EXAM:  BP (!) 118/58   Pulse 89   Temp 98.3 °F (36.8 °C) (Oral)   Resp 18   Ht 5' 2" (1.575 m)   Wt 52.3 kg (115 lb 6.6 oz)   SpO2 98%   BMI 21.11 kg/m²     General Appearance:    Alert, cooperative, no distress, appears stated age   Lungs:     Clear to auscultation bilaterally, respirations unlabored    Heart:    Regular rate and rhythm, S1 and S2 normal   Breast Exam:    Bilateral reconstruction, No tenderness, masses, or nipple abnormality   Abdomen:     Soft, non-tender, bowel sounds active, no masses, no organomegaly   Extremities:   Extremities normal, atraumatic, no cyanosis or edema   Pulses:   2+ and symmetric all extremities   Skin:   Skin color, texture, turgor normal, no rashes or lesions   Lymph nodes:   Cervical, supraclavicular, and axillary nodes normal   Neurologic:   CNII-XII intact, normal strength, sensation and reflexes     throughout           Pertinent Labs & Imaging:      No results found for this or any previous visit (from the past 24 hour(s)).    Assessment & Plan:    1. Malignant neoplasm of " upper-outer quadrant of right breast in female, estrogen receptor positive      Overall patient is doing well. No new issues now that she has completed letrozole  Patient will continue to follow with gynecology for dyspareunia and lichen sclerosis. Ok for testosterone cream  Continue on Prolia q6mos with Dr Meyer  RTC 6 mos as scheduled    Per NCCN Guidelines, breast cancer patients should be followed every 3-12 months for the first five years and then annually thereafter with annual mammography if mastectomy or breast reconstruction was not undertaken. At this time, there is no indication for routine laboratory or imaging in the surveillance for metastatic disease, unless clinical signs or symptoms arise.     Women on Aromatase inhibitors should have baseline bone density testing and at least biannually thereafter.     Healthy diet, low alcohol intake, and an active lifestyle, with a goal of an ideal BMI (20-25) is also encouraged to reduce the risk of breast cancer occurrences and recurrences, as well as improve side effects to anti-estrogen medications      Med Onc Chart Routing      Follow up with physician 6 months.   Follow up with SAMANTA    Infusion scheduling note    Injection scheduling note    Labs    Imaging    Pharmacy appointment    Other referrals          Total time of this visit, including time spent face to face with patient and/or via video/audio, and also in preparing for today's visit for MDM and documentation. (Medical Decision Making, including consideration of possible diagnoses, management options, complex medical record review, review of diagnostic tests and information, consideration and discussion of significant complications based on comorbidities, and discussion with providers involved with the care of the patient) 30 minutes. Greater than 50% was spent face to face with the patient counseling and coordinating care.      Kateryna Mccray MD  02/10/2023

## 2023-06-30 ENCOUNTER — TELEPHONE (OUTPATIENT)
Dept: HEMATOLOGY/ONCOLOGY | Facility: CLINIC | Age: 58
End: 2023-06-30
Payer: COMMERCIAL

## 2023-06-30 NOTE — TELEPHONE ENCOUNTER
----- Message from Antonina De Santiago sent at 6/30/2023  9:00 AM CDT -----  Regarding: Reschedule Appt  Contact: Pt  Pt is calling regarding upcoming appt. Pt would like to reschedule from 8/18 to 8/25 if possible. Please adv pt       Confirmed contact below:   Contact Name:Gwendolyn Abraham  Phone Number: [phone]

## 2023-08-25 ENCOUNTER — OFFICE VISIT (OUTPATIENT)
Dept: HEMATOLOGY/ONCOLOGY | Facility: CLINIC | Age: 58
End: 2023-08-25
Payer: COMMERCIAL

## 2023-08-25 VITALS
HEART RATE: 97 BPM | SYSTOLIC BLOOD PRESSURE: 114 MMHG | HEIGHT: 62 IN | RESPIRATION RATE: 18 BRPM | BODY MASS INDEX: 20 KG/M2 | DIASTOLIC BLOOD PRESSURE: 56 MMHG | WEIGHT: 108.69 LBS | TEMPERATURE: 99 F | OXYGEN SATURATION: 98 %

## 2023-08-25 DIAGNOSIS — Z17.0 MALIGNANT NEOPLASM OF UPPER-OUTER QUADRANT OF RIGHT BREAST IN FEMALE, ESTROGEN RECEPTOR POSITIVE: Primary | ICD-10-CM

## 2023-08-25 DIAGNOSIS — C50.411 MALIGNANT NEOPLASM OF UPPER-OUTER QUADRANT OF RIGHT BREAST IN FEMALE, ESTROGEN RECEPTOR POSITIVE: Primary | ICD-10-CM

## 2023-08-25 PROCEDURE — 99999 PR PBB SHADOW E&M-EST. PATIENT-LVL IV: CPT | Mod: PBBFAC,,, | Performed by: INTERNAL MEDICINE

## 2023-08-25 PROCEDURE — 3078F PR MOST RECENT DIASTOLIC BLOOD PRESSURE < 80 MM HG: ICD-10-PCS | Mod: CPTII,S$GLB,, | Performed by: INTERNAL MEDICINE

## 2023-08-25 PROCEDURE — 3008F PR BODY MASS INDEX (BMI) DOCUMENTED: ICD-10-PCS | Mod: CPTII,S$GLB,, | Performed by: INTERNAL MEDICINE

## 2023-08-25 PROCEDURE — 3074F PR MOST RECENT SYSTOLIC BLOOD PRESSURE < 130 MM HG: ICD-10-PCS | Mod: CPTII,S$GLB,, | Performed by: INTERNAL MEDICINE

## 2023-08-25 PROCEDURE — 1159F MED LIST DOCD IN RCRD: CPT | Mod: CPTII,S$GLB,, | Performed by: INTERNAL MEDICINE

## 2023-08-25 PROCEDURE — 99999 PR PBB SHADOW E&M-EST. PATIENT-LVL IV: ICD-10-PCS | Mod: PBBFAC,,, | Performed by: INTERNAL MEDICINE

## 2023-08-25 PROCEDURE — 99214 OFFICE O/P EST MOD 30 MIN: CPT | Mod: S$GLB,,, | Performed by: INTERNAL MEDICINE

## 2023-08-25 PROCEDURE — 1159F PR MEDICATION LIST DOCUMENTED IN MEDICAL RECORD: ICD-10-PCS | Mod: CPTII,S$GLB,, | Performed by: INTERNAL MEDICINE

## 2023-08-25 PROCEDURE — 3078F DIAST BP <80 MM HG: CPT | Mod: CPTII,S$GLB,, | Performed by: INTERNAL MEDICINE

## 2023-08-25 PROCEDURE — 3008F BODY MASS INDEX DOCD: CPT | Mod: CPTII,S$GLB,, | Performed by: INTERNAL MEDICINE

## 2023-08-25 PROCEDURE — 3074F SYST BP LT 130 MM HG: CPT | Mod: CPTII,S$GLB,, | Performed by: INTERNAL MEDICINE

## 2023-08-25 PROCEDURE — 99214 PR OFFICE/OUTPT VISIT, EST, LEVL IV, 30-39 MIN: ICD-10-PCS | Mod: S$GLB,,, | Performed by: INTERNAL MEDICINE

## 2023-08-25 RX ORDER — VALACYCLOVIR HYDROCHLORIDE 500 MG/1
500 TABLET, FILM COATED ORAL
COMMUNITY
Start: 2023-08-05

## 2023-08-25 RX ORDER — NYSTATIN 500000 [USP'U]/1
1 TABLET, COATED ORAL 2 TIMES DAILY
COMMUNITY
Start: 2023-08-23

## 2023-08-25 NOTE — PROGRESS NOTES
[facility]/ [facility]   at [facility] Note:      Chief Complaint:   Encounter Diagnosis   Name Primary?    Malignant neoplasm of upper-outer quadrant of right breast in female, estrogen receptor positive Yes          Cancer Staging   Malignant neoplasm of breast  Staging form: Breast, AJCC 8th Edition  - Clinical stage from 11/22/2011: Stage IIA (cT2, cN1mi, cM0, G2, ER+, NE+, HER2-) - Signed by Kateryna Mccray MD on 7/26/2021      HPI:  Gwendolyn Abraham is a 58 y.o. female who presents today for follow up of breast cancer. She has been doing well and is now off of Letrozole x 1 year     Oncology History:  Oncologic history begins back in Nov 2011.   She found a lump in her right breast, mammogram confirmed the abnormality.   Seen by Dr Ma. Biopsy done 11/6/11: infiltrating ductal carcinoma.   11/22/11: bilateral mastectomies.  Right breast pathology: 4.5cm lobular carcinoma, foci of vascular invasion noted. The subepithelial nipple positive for tumor.   SLN+ individual and minute groups of metastatic tumor cells.  largest metastasis 0.3mm (just over 200 cells) ER >95%/NE 90%/Ki67=15%/ Her2-  Left breast: no malignancy  Pathologic staging was [id number]  Following recovery from surgery she had a left portacath placed and was started on chemotherapy.  She received 12 weekly cycles of Taxol followed by 4 cycles of AC.   Following chemotherapy she received radiation therapy to the chest wall, axilla and supraclavicular area. Radiation therapy was completed 7/30/12.  She has been on tamoxifen since completing XRT (started Tamoxifen 8/2012)  Patient was still actively menstruating at the time of her diagnosis     Patient had her second breast procedure Jan 24th-second part of the reconstruction.  Repeat mammogram and US - fat necrosis Jan 2015  Reconstruction done by Brian     4/14/15: CA 27-29 = 15  8/12/15: CA 27-29 = 15  8/5/16: CA 27-29 = 16  8/2017  "switched from Tamoxifen to letrozole discussed 10 years of therapy for Stage II breast cancer  Completed therapy Aug 2022    Now seeing Dr Willard Meyer for Prolia injection          Patient Active Problem List   Diagnosis    Asthma    Malignant neoplasm of breast    Atrophic vaginitis       Review of Systems:   Review of Systems   Constitutional: Negative.    HENT: Negative.     Respiratory: Negative.  Negative for cough and shortness of breath.    Cardiovascular: Negative.  Negative for chest pain.   Gastrointestinal: Negative.  Negative for abdominal pain and diarrhea.   Genitourinary:  Negative for frequency.   Musculoskeletal:  Positive for joint pain. Negative for back pain and falls.   Skin:  Negative for rash.   Neurological: Negative.    Endo/Heme/Allergies: Negative.    Psychiatric/Behavioral:  The patient is not nervous/anxious.    All other systems reviewed and are negative.      PHYSICAL EXAM:  BP (!) 114/56   Pulse 97   Temp 98.7 °F (37.1 °C) (Oral)   Resp 18   Ht 5' 2" (1.575 m)   Wt 49.3 kg (108 lb 11 oz)   SpO2 98%   BMI 19.88 kg/m²     General Appearance:    Alert, cooperative, no distress, appears stated age   Lungs:     Clear to auscultation bilaterally, respirations unlabored    Heart:    Regular rate and rhythm, S1 and S2 normal   Breast Exam:    Bilateral reconstruction, No tenderness, masses, or nipple abnormality   Abdomen:     Soft, non-tender, bowel sounds active, no masses, no organomegaly   Extremities:   Extremities normal, atraumatic, no cyanosis or edema   Pulses:   2+ and symmetric all extremities   Skin:   Skin color, texture, turgor normal, no rashes or lesions   Lymph nodes:   Cervical, supraclavicular, and axillary nodes normal   Neurologic:   CNII-XII intact, normal strength, sensation and reflexes     throughout           Pertinent Labs & Imaging:      No results found for this or any previous visit (from the past 24 hour(s)).    Assessment & Plan:    1. Malignant " neoplasm of upper-outer quadrant of right breast in female, estrogen receptor positive      Overall patient is doing well. Off of letrozole x 1 year   Patient will continue to follow with gynecology for dyspareunia and lichen sclerosis. Ok for testosterone cream  Continue on Prolia q6mos with Dr Meyer  RTC 6 mos as scheduled    Per NCCN Guidelines, breast cancer patients should be followed every 3-12 months for the first five years and then annually thereafter with annual mammography if mastectomy or breast reconstruction was not undertaken. At this time, there is no indication for routine laboratory or imaging in the surveillance for metastatic disease, unless clinical signs or symptoms arise.     Women on Aromatase inhibitors should have baseline bone density testing and at least biannually thereafter.     Healthy diet, low alcohol intake, and an active lifestyle, with a goal of an ideal BMI (20-25) is also encouraged to reduce the risk of breast cancer occurrences and recurrences, as well as improve side effects to anti-estrogen medications      Med Onc Chart Routing      Follow up with physician 1 year.   Follow up with SAMANTA    Infusion scheduling note    Injection scheduling note    Labs    Imaging    Pharmacy appointment    Other referrals                Total time of this visit, including time spent face to face with patient and/or via video/audio, and also in preparing for today's visit for MDM and documentation. (Medical Decision Making, including consideration of possible diagnoses, management options, complex medical record review, review of diagnostic tests and information, consideration and discussion of significant complications based on comorbidities, and discussion with providers involved with the care of the patient) 30 minutes. Greater than 50% was spent face to face with the patient counseling and coordinating care.      Kateryna Mccray MD  08/25/2023

## 2024-05-07 DIAGNOSIS — Z17.0 MALIGNANT NEOPLASM OF UPPER-OUTER QUADRANT OF RIGHT BREAST IN FEMALE, ESTROGEN RECEPTOR POSITIVE: ICD-10-CM

## 2024-05-07 DIAGNOSIS — C50.411 MALIGNANT NEOPLASM OF UPPER-OUTER QUADRANT OF RIGHT BREAST IN FEMALE, ESTROGEN RECEPTOR POSITIVE: ICD-10-CM

## 2024-05-07 RX ORDER — CITALOPRAM 40 MG/1
40 TABLET, FILM COATED ORAL DAILY
Qty: 90 TABLET | Refills: 3 | Status: SHIPPED | OUTPATIENT
Start: 2024-05-07

## 2024-05-07 NOTE — TELEPHONE ENCOUNTER
"----- Message from Sienna Reddy sent at 5/7/2024 12:05 PM CDT -----  Regarding: Refill  RX Name and Strength:  citalopram (CELEXA) 40 MG tablet        How is the patient currently taking it?    Take 1 tablet (40 mg total) by mouth once daily. - Oral      Is this a 30 day or 90 day Rx?   90 tablet      Preferred Pharmacy with phone number:    [facility]  [address] [facility] [address]  [address] [facility] [address]  [place] [address]  Phone: [phone] Fax: [phone]      Local or Mail Order:  Local      Ordering Provider:   Dr. Mccray      Contact Preference:  Mamta acosta/ [facility] @ [phone]      Additional Information:  "Thank you for all that you do for our patients"  "

## 2024-11-01 ENCOUNTER — OFFICE VISIT (OUTPATIENT)
Dept: HEMATOLOGY/ONCOLOGY | Facility: CLINIC | Age: 59
End: 2024-11-01
Payer: COMMERCIAL

## 2024-11-01 VITALS
TEMPERATURE: 98 F | SYSTOLIC BLOOD PRESSURE: 109 MMHG | BODY MASS INDEX: 18.8 KG/M2 | DIASTOLIC BLOOD PRESSURE: 53 MMHG | HEART RATE: 84 BPM | RESPIRATION RATE: 18 BRPM | OXYGEN SATURATION: 100 % | HEIGHT: 62 IN | WEIGHT: 102.19 LBS

## 2024-11-01 DIAGNOSIS — C50.411 MALIGNANT NEOPLASM OF UPPER-OUTER QUADRANT OF RIGHT BREAST IN FEMALE, ESTROGEN RECEPTOR POSITIVE: Primary | ICD-10-CM

## 2024-11-01 DIAGNOSIS — Z17.0 MALIGNANT NEOPLASM OF UPPER-OUTER QUADRANT OF RIGHT BREAST IN FEMALE, ESTROGEN RECEPTOR POSITIVE: Primary | ICD-10-CM

## 2024-11-01 PROCEDURE — 99999 PR PBB SHADOW E&M-EST. PATIENT-LVL IV: CPT | Mod: PBBFAC,,, | Performed by: INTERNAL MEDICINE
